# Patient Record
Sex: FEMALE | Race: WHITE | NOT HISPANIC OR LATINO | Employment: UNEMPLOYED | URBAN - METROPOLITAN AREA
[De-identification: names, ages, dates, MRNs, and addresses within clinical notes are randomized per-mention and may not be internally consistent; named-entity substitution may affect disease eponyms.]

---

## 2023-08-23 ENCOUNTER — HOSPITAL ENCOUNTER (OUTPATIENT)
Dept: DATA CONVERSION | Facility: HOSPITAL | Age: 33
Discharge: HOME | End: 2023-08-23
Payer: COMMERCIAL

## 2023-08-23 DIAGNOSIS — Z00.00 ENCOUNTER FOR GENERAL ADULT MEDICAL EXAMINATION WITHOUT ABNORMAL FINDINGS: ICD-10-CM

## 2023-08-23 DIAGNOSIS — R63.4 ABNORMAL WEIGHT LOSS: ICD-10-CM

## 2023-08-23 DIAGNOSIS — Z13.1 ENCOUNTER FOR SCREENING FOR DIABETES MELLITUS: ICD-10-CM

## 2023-08-23 DIAGNOSIS — R20.2 PARESTHESIA OF SKIN: ICD-10-CM

## 2023-08-23 LAB
25(OH)D3 SERPL-MCNC: 107 NG/ML (ref 31–100)
ALBUMIN SERPL-MCNC: 4.2 GM/DL (ref 3.5–5)
ALBUMIN/GLOB SERPL: 2.3 RATIO (ref 1.5–3)
ALP BLD-CCNC: 65 U/L (ref 35–125)
ALT SERPL-CCNC: 53 U/L (ref 5–40)
ANION GAP SERPL CALCULATED.3IONS-SCNC: 11 MMOL/L (ref 0–19)
APPEARANCE PLAS: CLEAR
AST SERPL-CCNC: 38 U/L (ref 5–40)
BILIRUB SERPL-MCNC: 0.3 MG/DL (ref 0.1–1.2)
BUN SERPL-MCNC: 35 MG/DL (ref 8–25)
BUN/CREAT SERPL: 35 RATIO (ref 8–21)
CALCIUM SERPL-MCNC: 8.9 MG/DL (ref 8.5–10.4)
CHLORIDE SERPL-SCNC: 108 MMOL/L (ref 97–107)
CHOLEST SERPL-MCNC: 173 MG/DL (ref 133–200)
CHOLEST/HDLC SERPL: 3.1 RATIO
CO2 SERPL-SCNC: 24 MMOL/L (ref 24–31)
COLOR SPUN FLD: YELLOW
CREAT SERPL-MCNC: 1 MG/DL (ref 0.4–1.6)
DEPRECATED RDW RBC AUTO: 46.5 FL (ref 37–54)
ERYTHROCYTE [DISTWIDTH] IN BLOOD BY AUTOMATED COUNT: 12.8 % (ref 11.7–15)
FASTING STATUS PATIENT QL REPORTED: NORMAL
FOLATE SERPL-MCNC: 13.2 NG/ML (ref 4.2–19.9)
GFR SERPL CREATININE-BSD FRML MDRD: 77 ML/MIN/1.73 M2
GLOBULIN SER-MCNC: 1.8 G/DL (ref 1.9–3.7)
GLUCOSE SERPL-MCNC: 89 MG/DL (ref 65–99)
HBA1C MFR BLD: 4.6 % (ref 4–6)
HCT VFR BLD AUTO: 37.6 % (ref 36–44)
HDLC SERPL-MCNC: 56 MG/DL
HGB BLD-MCNC: 12.6 GM/DL (ref 12–15)
IRON SATN MFR SERPL: 17.1 % (ref 12–50)
IRON SERPL-MCNC: 60 UG/DL (ref 30–160)
LDLC SERPL CALC-MCNC: 105 MG/DL (ref 65–130)
MCH RBC QN AUTO: 33 PG (ref 26–34)
MCHC RBC AUTO-ENTMCNC: 33.5 % (ref 31–37)
MCV RBC AUTO: 98.4 FL (ref 80–100)
NRBC BLD-RTO: 0 /100 WBC
PLATELET # BLD AUTO: 188 K/UL (ref 150–450)
PMV BLD AUTO: 10.9 CU (ref 7–12.6)
POTASSIUM SERPL-SCNC: 4.5 MMOL/L (ref 3.4–5.1)
PROT SERPL-MCNC: 6 G/DL (ref 5.9–7.9)
RBC # BLD AUTO: 3.82 M/UL (ref 4–4.9)
SODIUM SERPL-SCNC: 143 MMOL/L (ref 133–145)
TIBC SERPL-MCNC: 350 UG/DL (ref 228–428)
TRIGL SERPL-MCNC: 61 MG/DL (ref 40–150)
TSH SERPL DL<=0.05 MIU/L-ACNC: 3.18 MIU/L (ref 0.27–4.2)
VIT B12 SERPL-MCNC: 1178 PG/ML (ref 211–946)
WBC # BLD AUTO: 4 K/UL (ref 4.5–11)

## 2023-10-26 PROBLEM — R87.810 CERVICAL HIGH RISK HUMAN PAPILLOMAVIRUS (HPV) DNA TEST POSITIVE: Status: ACTIVE | Noted: 2023-10-26

## 2023-10-26 PROBLEM — K29.60 REFLUX GASTRITIS: Status: ACTIVE | Noted: 2023-10-26

## 2023-10-26 PROBLEM — D72.819 LEUKOPENIA: Status: ACTIVE | Noted: 2023-10-26

## 2023-10-26 PROBLEM — N81.10 FEMALE CYSTOCELE: Status: ACTIVE | Noted: 2023-10-26

## 2023-10-26 PROBLEM — N91.2 AMENORRHEA: Status: ACTIVE | Noted: 2023-10-26

## 2023-10-26 PROBLEM — N60.19 FIBROCYSTIC BREAST DISEASE (FCBD): Status: ACTIVE | Noted: 2023-10-26

## 2023-10-26 PROBLEM — R20.2 PARESTHESIAS: Status: ACTIVE | Noted: 2023-10-26

## 2023-10-26 RX ORDER — MIRTAZAPINE 15 MG/1
0.5 TABLET, FILM COATED ORAL NIGHTLY
COMMUNITY
Start: 2023-08-22 | End: 2023-10-27 | Stop reason: WASHOUT

## 2023-10-26 RX ORDER — IBUPROFEN 600 MG/1
1 TABLET ORAL EVERY 6 HOURS PRN
COMMUNITY
End: 2024-02-07 | Stop reason: WASHOUT

## 2023-10-26 RX ORDER — DOCUSATE SODIUM 100 MG/1
1 CAPSULE, LIQUID FILLED ORAL DAILY PRN
COMMUNITY
End: 2023-10-27 | Stop reason: WASHOUT

## 2023-10-26 ASSESSMENT — ENCOUNTER SYMPTOMS
MUSCLE WEAKNESS: 1
INABILITY TO BEAR WEIGHT: 1
TINGLING: 1
LOSS OF MOTION: 1

## 2023-10-27 ENCOUNTER — TELEPHONE (OUTPATIENT)
Dept: PRIMARY CARE | Facility: CLINIC | Age: 33
End: 2023-10-27

## 2023-10-27 ENCOUNTER — OFFICE VISIT (OUTPATIENT)
Dept: PRIMARY CARE | Facility: CLINIC | Age: 33
End: 2023-10-27
Payer: COMMERCIAL

## 2023-10-27 VITALS
WEIGHT: 89.2 LBS | HEART RATE: 43 BPM | TEMPERATURE: 96.7 F | SYSTOLIC BLOOD PRESSURE: 108 MMHG | BODY MASS INDEX: 14.34 KG/M2 | OXYGEN SATURATION: 100 % | DIASTOLIC BLOOD PRESSURE: 68 MMHG | HEIGHT: 66 IN

## 2023-10-27 DIAGNOSIS — F32.0 MILD MAJOR DEPRESSION (CMS-HCC): ICD-10-CM

## 2023-10-27 DIAGNOSIS — E46 PROTEIN-CALORIE MALNUTRITION, UNSPECIFIED SEVERITY (MULTI): Primary | ICD-10-CM

## 2023-10-27 DIAGNOSIS — S99.911A INJURY OF RIGHT ANKLE, INITIAL ENCOUNTER: ICD-10-CM

## 2023-10-27 PROBLEM — N87.1 DYSPLASIA OF CERVIX, HIGH GRADE CIN 2: Status: ACTIVE | Noted: 2018-02-05

## 2023-10-27 PROCEDURE — 3008F BODY MASS INDEX DOCD: CPT | Performed by: FAMILY MEDICINE

## 2023-10-27 PROCEDURE — 99214 OFFICE O/P EST MOD 30 MIN: CPT | Performed by: FAMILY MEDICINE

## 2023-10-27 PROCEDURE — 1036F TOBACCO NON-USER: CPT | Performed by: FAMILY MEDICINE

## 2023-10-27 ASSESSMENT — PATIENT HEALTH QUESTIONNAIRE - PHQ9
2. FEELING DOWN, DEPRESSED OR HOPELESS: NOT AT ALL
1. LITTLE INTEREST OR PLEASURE IN DOING THINGS: NOT AT ALL
SUM OF ALL RESPONSES TO PHQ9 QUESTIONS 1 AND 2: 0

## 2023-10-27 ASSESSMENT — PAIN SCALES - GENERAL: PAINLEVEL: 10-WORST PAIN EVER

## 2023-10-27 NOTE — PATIENT INSTRUCTIONS
Problem List Items Addressed This Visit    None  Visit Diagnoses         Codes    Protein-calorie malnutrition, unspecified severity (CMS/HCC)    -  Primary E46            Additional Visit Plans:  YOUR BMI IS NOW 14.6!!!! YOU ARE MALNOURISHED. YOU HAVE LOST OVER 7 LBS IN THE PAST MONTH. NOT IMPROVING YOUR WEIGHT OR EVEN MAINTAINING.     Please go and see the nutritionist. You need to find out what you can add to your diet to maintain your metabolism and energy needs, but also to improve your weight and health. This is becoming concerning and dangerous. A one time binge on occasion is not enough to offset things.     I recommend another trial of mirtazapine for at least 3 weeks. Recruit help with the kids. Take this at night before bed so that you sleep through the drowsiness.     Please go to [facility] and do the clinical intake. Then pair up with a therapist.     Follow up with me in 2 weeks.     I recommend applying voltaren gel to the area 3-4 times a day as needed and taking 500mg tylenol every 6 hours.      Will get xray to look at bony alignment / any fracture and have you see Dr. Mckinney        Patient Care Team:  Vincent Armenta DO as PCP - General (Family Medicine)  Vincent Armenta DO as Primary Care Provider    Vincent Armenta DO   10/27/23   11:19 AM

## 2023-10-27 NOTE — TELEPHONE ENCOUNTER
Referral placed to social work. I am going to try and recruit the help of faby Melgar to help with patient. Please let Luanne know and tell her that Faby may be in touch sometime.  I am extremely concerned about Luanne's condition and something needs to change / happen to improve her health. Please tell Luanne that I hope she will partner with me in getting things better. And that if she does not work with me on this then this may prove to be a nontherapeutic relationship and may result in dismissal from our practice as I am being ineffective as a healthcare provider for her condition.  Perhaps a different primary care provider would have better outcomes / treatment plans to help her and I do not want to get in the way of that.

## 2023-10-27 NOTE — PROGRESS NOTES
Outpatient Visit Note    Chief Complaint   Patient presents with    Ankle Pain     Right ankle swelling and bruising for a few weeks       HPI:  Luanne Mahan is a 33 y.o. female here for ankle concerns.    Last saw her just over a month ago.  She is due for follow-up with me at this time as a pertains to her weight and nutrition, and her mental health.    She has lost just over 7 pounds since I last saw her last month.    I previously recommended mirtazapine but she tried this very briefly and discontinued it as she felt very exhausted right after taking the medication.    Not seeing Beryl anymore.  Has not established with a new counselor, says one referral does not take her insurance. One said come in anytime for an intake which she has not done. She says she did reach out to a person that she read was a good therapist (at [facility]) Sudha Schumacher. Has left 3 message for call back, has not heard anything.     With regards to her ankle, she states that a few weeks ago she started to gradually feel some pain along her anterior ankle, radiating up her anterior leg. No known trauma. She feels things are clinking in her ankle / not sitting right. Went to a chiropractor and he tried to put it back in place and he told her something is not aligned right. He asked her to stop running which she has done. On occasion her ankle swells. It feels bruised along the high proximal end of her anterior ankle (right foot). Wearing a compression feels good and takes the pain down a lot. Getting worse over time. She is limping. Can barely put pressure on it. Is getting tingling and temperature change sensation in her foot.     Says there was a day when she was walking on the treadmill and felt a little pinch in that area. She kept walking and things were seemingly ok.     Had tried IBU twice with no relief.     With all of my questioning pertaining to her weight and her lifestyle, telling patient that she is now  malnourished and that she is definitely not eating enough regardless of how much she is exercising or how much she has cut back from her running.  Patient is telling me today repeatedly that the scales are wrong, that she weighed over 100 pounds yesterday at home.  I explained to her that I am comparing her weight on the same scale that we used at her last 2 visits.  She kept telling me that this is wrong.  She assures me that she is making efforts to eat more.  On occasion she will go out on the weekends and say that she binges a ton.  I explained to her that this is not enough to offset her poor nutrition and to maintain her weight, let alone help improve her weight to a healthy level.  We discussed that binge eating is not healthy either.  She states that she feels healthy, she does not feel malnourished.  I told her that she is malnourished.      No past medical history on file.     Current Medications  Current Outpatient Medications   Medication Instructions    ibuprofen 600 mg tablet 1 tablet, oral, Every 6 hours PRN    lactobacillus acidophilus & bulgar (Lactinex) 1 million cell chewable tablet 1 tablet, oral, Daily    MULTIVITAMIN ORAL 1 tablet, oral, Daily        Allergies  Allergies   Allergen Reactions    Cat Dander Shortness of breath and Runny nose    Loracarbef Hives and Itching        No past surgical history on file.  Family History   Problem Relation Name Age of Onset    Panic attack Mother      Diabetes Father      Congenital heart disease Father      No Known Problems Sister      No Known Problems Brother      No Known Problems Son      Diabetes Maternal Grandmother      Breast cancer Maternal Grandmother      Hypertension Maternal Grandfather      Heart disease Paternal Grandmother      Stroke Paternal Grandmother      Heart disease Paternal Grandfather          Tobacco Use: Not on file        ROS  All pertinent positive symptoms are included in the history of present illness.  All other systems  have been reviewed and are negative and noncontributory to this patient's current ailments.    VITAL SIGNS  Vitals:    10/27/23 1054   BP: 108/68   Pulse: (!) 43   Temp: 35.9 °C (96.7 °F)   SpO2: 100%     Vitals:    10/27/23 1054   Weight: (!) 40.5 kg (89 lb 3.2 oz)      Body mass index is 14.62 kg/m².     PHYSICAL EXAM  GENERAL APPEARANCE: very thin, looks like stated age, in no acute distress, not ill or tired appearing, conversing well.   HEENT: no trauma, normocephalic.   NECK: supple without rigidity, no neck mass was observed.   LUNGS: good chest wall expansion. In no acute respiratory distress.   EXTREMITIES: moving all extremities equally with no edema.  Tenderness to light palpation over the proximal anterior aspect of her right ankle without palpable bony deformity, mild induration.  No abrasions.  No increased laxity with inversion, eversion or talar tilt.  Pain with resisted foot dorsiflexion.  No pain or swelling over the lateral or medial malleoli  SKIN: normal skin color and pigmentation, without rash.   NEUROLOGIC EXAM: CN II-XII grossly intact, limping  PSYCH: mood and affect appropriate; alert and oriented to time, place, person; no difficulty with speech or language.       Assessment/Plan   Problem List Items Addressed This Visit    None  Visit Diagnoses         Codes    Protein-calorie malnutrition, unspecified severity (CMS/HCC)    -  Primary E46            Additional Visit Plans:  YOUR BMI IS NOW 14.6!!!! YOU ARE MALNOURISHED. YOU HAVE LOST OVER 7 LBS IN THE PAST MONTH. NOT IMPROVING YOUR WEIGHT OR EVEN MAINTAINING.     Please go and see the nutritionist. You need to find out what you can add to your diet to maintain your metabolism and energy needs, but also to improve your weight and health. This is becoming concerning and dangerous. A one time binge on occasion is not enough to offset things.     I recommend another trial of mirtazapine for at least 3 weeks. Recruit help with the kids. Take  this at night before bed so that you sleep through the drowsiness.     Please go to [facility] and do the clinical intake. Then pair up with a therapist.     Follow up with me in 2 weeks.     I recommend applying voltaren gel to the area 3-4 times a day as needed and taking 500mg tylenol every 6 hours.      Will get xray to look at bony alignment / any fracture and have you see Dr. Mckinney        Patient Care Team:  Vincent Armenta DO as PCP - General (Family Medicine)  Vincent Armenta DO as Primary Care Provider    Vincent Armenta DO   10/27/23   11:19 AM

## 2023-10-30 ENCOUNTER — HOSPITAL ENCOUNTER (OUTPATIENT)
Dept: RADIOLOGY | Facility: HOSPITAL | Age: 33
Discharge: HOME | End: 2023-10-30
Payer: COMMERCIAL

## 2023-10-30 ENCOUNTER — E-CONSULT (OUTPATIENT)
Dept: BEHAVIORAL HEALTH | Facility: CLINIC | Age: 33
End: 2023-10-30
Payer: COMMERCIAL

## 2023-10-30 ENCOUNTER — PATIENT OUTREACH (OUTPATIENT)
Dept: CARE COORDINATION | Facility: CLINIC | Age: 33
End: 2023-10-30
Payer: COMMERCIAL

## 2023-10-30 ENCOUNTER — TELEPHONE (OUTPATIENT)
Dept: PRIMARY CARE | Facility: CLINIC | Age: 33
End: 2023-10-30

## 2023-10-30 DIAGNOSIS — S99.911A INJURY OF RIGHT ANKLE, INITIAL ENCOUNTER: ICD-10-CM

## 2023-10-30 PROCEDURE — 73590 X-RAY EXAM OF LOWER LEG: CPT | Mod: RT,FY

## 2023-10-30 PROCEDURE — 73610 X-RAY EXAM OF ANKLE: CPT | Mod: RT,FY

## 2023-10-30 SDOH — ECONOMIC STABILITY: FOOD INSECURITY
ARE ANY OF YOUR NEEDS URGENT? FOR EXAMPLE, UNCERTAINTY OF WHERE YOU WILL GET YOUR NEXT MEAL OR NOT HAVING THE MEDICATIONS YOU NEED TO TAKE TOMORROW.: YES

## 2023-10-30 SDOH — ECONOMIC STABILITY: GENERAL: WOULD YOU LIKE HELP WITH ANY OF THE FOLLOWING NEEDS?: SAFETY CONCERN;FOOD INSECURITY;OTHER

## 2023-10-30 NOTE — PROGRESS NOTES
"Received referral from Dr. ISAIAS Armenta for follow up and assistance with addressing Luanne's depression, anxiety and malnutrition concerns.  Pt has a history of depression, anxiety, weight loss.  She last saw her PCP on 10/27/2023 and lost another 7 lbs from 9/18/2023 visit.  PCP notes patient came to see her due to right ankle swelling and bruising for a few weeks. Dr. Schilling addressed her concerns pertaining to Luanne's continued weight loss and advised her that she is now malnourished and that she is not eating enough.  She was provided with resources for walk in at [facility].  Luanne had been seeing a counselor in the past and [facility]  but is not and is looking for a new counselor.  Outreach to Luanne this date to assess further, assist with Community resources and to address concerns that her PCP has shared with her.  Call to patient on her listed cell #.  She is not please with her PCP for referring to .  Explained our role and provided emotional support.  Resources reviewed to Luanne for [facility] walk in assess in [place] [phone].   She is aware of the concerns for both her mental and physical health concerns.  She does drive. Has health insurance with [place] [employer] and is able to obtain counseling and support.  She has not yet looked into her ankle follow up with referral provided but, does plan to call and make an appointment with Dr.Timothy Mckinney [facility]  Foot and Ankle specialist [phone]. Luanne does have our  Cell #.  Resources txt to her since she was in her car.  We will plan for follow up next week.  She is to see her PCP in 2 weeks for follow up.  No appointment noted at this time.  "

## 2023-10-30 NOTE — PROGRESS NOTES
In order to be admitted involuntarily for an eating disorder there would need to be evidence of imminently life-threatening nutritional deficiencies. Labs, physical exam findings, or history to support imminent threat would all be beneficial when filling out the pink slip. Then the physician or staff would call 911 and explain that the physician intends to  pink slip a patient.     If she does not have sufficient justification to be involuntarily admitted, she would benefit from frequent monitoring and education. An SSRI like fluoxetine may be beneficial with respect to eating disorders. I see that she had a diagnosis of MDD in 2018 and was taking venlafaxine with good effect. She may be willing to resume that. Perhaps it lost efficacy a while ago but after a washout period it may be beneficial again. Wellbutrin is contraindicated with eating disorders.    Lastly, you can provide her and her  with information about the [facility] (https://[employer]). They are a program with robust services for eating disorders in [place]. At the very least her  may be able to call and get advice for how to help his wife.     Thank you for the consult.

## 2023-11-06 ENCOUNTER — PATIENT OUTREACH (OUTPATIENT)
Dept: CARE COORDINATION | Facility: CLINIC | Age: 33
End: 2023-11-06
Payer: COMMERCIAL

## 2023-11-06 NOTE — PROGRESS NOTES
Chart reviewed and E-Consult noted with Elvin Burton Psychiatrist.  Outreach to Luanne this date on her listed contact #.  She shares that she does have an appointment with a podiatrist next week.  She also shares that she has left several messages with [facility] and has not heard back from them.  She has been active with this agency in the past and is trying to obtain a new counselor.  Discussion about concerns for her malnutrition and need for immediate support.  Education provided regarding the [facility] [phone]   Located at [address] [place] [address].  The [facility] is a program that addresses the intricate challenges that come with eating disorders.  They have many different programs that Luanne may benefit from after a complete assessment.  We encouraged Luanne to phone [facility] this week.  Emotional support provided and needed at this difficult time.  We provided Luanne with our new  Jabber # [phone].  We will plan to follow up with her in 1 week to check status and progress. We encouraged Luanne to phone this  with any further questions.

## 2023-11-08 NOTE — TELEPHONE ENCOUNTER
okay happy to work with her.  I see that she has connected with the .  Information has been provided about the [facility].  She is trying to connect with a new counselor.  I look forward to hearing any progress she has made when she comes in for her appointment.  In the meantime continue to focus on her nutritional intake with a well-rounded diet

## 2023-11-13 ENCOUNTER — PATIENT OUTREACH (OUTPATIENT)
Dept: CARE COORDINATION | Facility: CLINIC | Age: 33
End: 2023-11-13
Payer: COMMERCIAL

## 2023-11-13 NOTE — PROGRESS NOTES
Weekly follow up with Luanne this date.  Noted that she has an appointment with Dr. ISAIAS Armenta on 11/17/2023.  She also shares that she is seeing a new counselor Lizett Moore LPC with [facility] in [place].  Emotional support provided.  We provided patient with our new [place]er # [phone].  We will plan to follow up next week with Luanne.

## 2023-11-17 ENCOUNTER — APPOINTMENT (OUTPATIENT)
Dept: PRIMARY CARE | Facility: CLINIC | Age: 33
End: 2023-11-17
Payer: COMMERCIAL

## 2023-11-20 ENCOUNTER — PATIENT OUTREACH (OUTPATIENT)
Dept: CARE COORDINATION | Facility: CLINIC | Age: 33
End: 2023-11-20
Payer: COMMERCIAL

## 2023-11-20 NOTE — PROGRESS NOTES
"Received return phone call from Luanne. She is now working with Lizett Moore LPC with [facility] [phone] and visit was cancelled by the provided due to health issue.  Patient rescheduling and will continue to work with Lizett regarding her depression weight loss issues and will work on \"making myself a priority\". More family support and spousal support in place per Luanne.  Recent cancellation of her appointment with PCP noted and discussed with Luanne.  Mal-nutritional concerns discussed as well as options if needed for the [facility] here in [place].  Luanne feels she is starting to address her mental health concerns and cancelled with PCP due to increased Panic attacks and anxiety leading up to her appointment with PCP. Patient shares her  is very supportive and she will continue to work with LPC to address all issues.  Luanne does have our Jabber # [phone].  Discussed all resources and need for ongoing Counseling.  We will follow up with Luanne next month.  Updated Goals and Care Plan.      "

## 2023-12-04 ENCOUNTER — PATIENT OUTREACH (OUTPATIENT)
Dept: CARE COORDINATION | Facility: CLINIC | Age: 33
End: 2023-12-04
Payer: COMMERCIAL

## 2023-12-04 NOTE — CARE PLAN
Follow up outreach to Luanne.  She is on her way to meet with her counselor Lizett Moore LPC with [facility] and plans to continue working with Lizett to address her issues.  She does not feel our services are needed any further.  She does have our Jabber # [phone].  We will close Luanne at this time.    CASE CLOSED  Problem: Access to Care Issue  Goal: Assess and Address Access Barriers  Outcome: Progressing     Problem: Coordination of Community Resources Needed  Goal: Coordination of Services will be Obtained  Outcome: Progressing     Problem: Coordination of Psychosocial Support Services Needed  Goal: Coordination of Services will be Obtained  Outcome: Progressing

## 2023-12-22 ENCOUNTER — TELEPHONE (OUTPATIENT)
Dept: PRIMARY CARE | Facility: CLINIC | Age: 33
End: 2023-12-22

## 2023-12-22 ENCOUNTER — APPOINTMENT (OUTPATIENT)
Dept: PRIMARY CARE | Facility: CLINIC | Age: 33
End: 2023-12-22
Payer: COMMERCIAL

## 2023-12-22 ENCOUNTER — DOCUMENTATION (OUTPATIENT)
Dept: PRIMARY CARE | Facility: CLINIC | Age: 33
End: 2023-12-22
Payer: COMMERCIAL

## 2024-01-03 ENCOUNTER — APPOINTMENT (OUTPATIENT)
Dept: PRIMARY CARE | Facility: CLINIC | Age: 34
End: 2024-01-03
Payer: COMMERCIAL

## 2024-01-22 ASSESSMENT — PROMIS GLOBAL HEALTH SCALE
RATE_QUALITY_OF_LIFE: GOOD
RATE_QUALITY_OF_LIFE: GOOD
RATE_SOCIAL_SATISFACTION: GOOD
RATE_AVERAGE_PAIN: 0
RATE_AVERAGE_FATIGUE: MILD
CARRYOUT_PHYSICAL_ACTIVITIES: COMPLETELY
CARRYOUT_PHYSICAL_ACTIVITIES: COMPLETELY
RATE_AVERAGE_FATIGUE: MILD
RATE_SOCIAL_SATISFACTION: GOOD
RATE_PHYSICAL_HEALTH: GOOD
RATE_AVERAGE_PAIN: 0
RATE_MENTAL_HEALTH: GOOD
EMOTIONAL_PROBLEMS: SOMETIMES
EMOTIONAL_PROBLEMS: SOMETIMES
RATE_PHYSICAL_HEALTH: GOOD
CARRYOUT_SOCIAL_ACTIVITIES: GOOD
CARRYOUT_SOCIAL_ACTIVITIES: GOOD
RATE_MENTAL_HEALTH: GOOD
RATE_GENERAL_HEALTH: GOOD
RATE_GENERAL_HEALTH: GOOD

## 2024-01-24 ENCOUNTER — APPOINTMENT (OUTPATIENT)
Dept: PRIMARY CARE | Facility: CLINIC | Age: 34
End: 2024-01-24
Payer: COMMERCIAL

## 2024-02-07 ENCOUNTER — OFFICE VISIT (OUTPATIENT)
Dept: PRIMARY CARE | Facility: CLINIC | Age: 34
End: 2024-02-07
Payer: COMMERCIAL

## 2024-02-07 VITALS
SYSTOLIC BLOOD PRESSURE: 110 MMHG | HEIGHT: 66 IN | BODY MASS INDEX: 15.43 KG/M2 | TEMPERATURE: 98.1 F | DIASTOLIC BLOOD PRESSURE: 70 MMHG | HEART RATE: 64 BPM | OXYGEN SATURATION: 99 % | WEIGHT: 96 LBS

## 2024-02-07 DIAGNOSIS — F41.1 GENERALIZED ANXIETY DISORDER: ICD-10-CM

## 2024-02-07 DIAGNOSIS — E46 PROTEIN-CALORIE MALNUTRITION, UNSPECIFIED SEVERITY (MULTI): ICD-10-CM

## 2024-02-07 DIAGNOSIS — R63.0 ANOREXIA: Primary | ICD-10-CM

## 2024-02-07 DIAGNOSIS — R63.2 BINGE EATING: ICD-10-CM

## 2024-02-07 PROCEDURE — 3008F BODY MASS INDEX DOCD: CPT | Performed by: FAMILY MEDICINE

## 2024-02-07 PROCEDURE — 1036F TOBACCO NON-USER: CPT | Performed by: FAMILY MEDICINE

## 2024-02-07 PROCEDURE — 99214 OFFICE O/P EST MOD 30 MIN: CPT | Performed by: FAMILY MEDICINE

## 2024-02-07 RX ORDER — COLLAGEN, HYDROLYSATE (BOVINE) 100 %
100 POWDER (GRAM) MISCELLANEOUS DAILY
COMMUNITY

## 2024-02-07 RX ORDER — FLUOXETINE HYDROCHLORIDE 20 MG/1
20 CAPSULE ORAL DAILY
Qty: 30 CAPSULE | Refills: 1 | Status: SHIPPED | OUTPATIENT
Start: 2024-02-07 | End: 2024-04-11 | Stop reason: WASHOUT

## 2024-02-07 ASSESSMENT — ANXIETY QUESTIONNAIRES
7. FEELING AFRAID AS IF SOMETHING AWFUL MIGHT HAPPEN: NEARLY EVERY DAY
3. WORRYING TOO MUCH ABOUT DIFFERENT THINGS: NEARLY EVERY DAY
4. TROUBLE RELAXING: NEARLY EVERY DAY
IF YOU CHECKED OFF ANY PROBLEMS ON THIS QUESTIONNAIRE, HOW DIFFICULT HAVE THESE PROBLEMS MADE IT FOR YOU TO DO YOUR WORK, TAKE CARE OF THINGS AT HOME, OR GET ALONG WITH OTHER PEOPLE: VERY DIFFICULT
GAD7 TOTAL SCORE: 19
1. FEELING NERVOUS, ANXIOUS, OR ON EDGE: MORE THAN HALF THE DAYS
6. BECOMING EASILY ANNOYED OR IRRITABLE: NEARLY EVERY DAY
2. NOT BEING ABLE TO STOP OR CONTROL WORRYING: MORE THAN HALF THE DAYS
5. BEING SO RESTLESS THAT IT IS HARD TO SIT STILL: NEARLY EVERY DAY

## 2024-02-07 ASSESSMENT — PATIENT HEALTH QUESTIONNAIRE - PHQ9
3. TROUBLE FALLING OR STAYING ASLEEP OR SLEEPING TOO MUCH: NEARLY EVERY DAY
1. LITTLE INTEREST OR PLEASURE IN DOING THINGS: SEVERAL DAYS
4. FEELING TIRED OR HAVING LITTLE ENERGY: SEVERAL DAYS
2. FEELING DOWN, DEPRESSED OR HOPELESS: SEVERAL DAYS
6. FEELING BAD ABOUT YOURSELF - OR THAT YOU ARE A FAILURE OR HAVE LET YOURSELF OR YOUR FAMILY DOWN: SEVERAL DAYS
7. TROUBLE CONCENTRATING ON THINGS, SUCH AS READING THE NEWSPAPER OR WATCHING TELEVISION: SEVERAL DAYS
8. MOVING OR SPEAKING SO SLOWLY THAT OTHER PEOPLE COULD HAVE NOTICED. OR THE OPPOSITE, BEING SO FIGETY OR RESTLESS THAT YOU HAVE BEEN MOVING AROUND A LOT MORE THAN USUAL: SEVERAL DAYS
5. POOR APPETITE OR OVEREATING: NEARLY EVERY DAY
SUM OF ALL RESPONSES TO PHQ QUESTIONS 1-9: 13
SUM OF ALL RESPONSES TO PHQ9 QUESTIONS 1 AND 2: 2
10. IF YOU CHECKED OFF ANY PROBLEMS, HOW DIFFICULT HAVE THESE PROBLEMS MADE IT FOR YOU TO DO YOUR WORK, TAKE CARE OF THINGS AT HOME, OR GET ALONG WITH OTHER PEOPLE: VERY DIFFICULT
9. THOUGHTS THAT YOU WOULD BE BETTER OFF DEAD, OR OF HURTING YOURSELF: SEVERAL DAYS

## 2024-02-07 ASSESSMENT — PAIN SCALES - GENERAL: PAINLEVEL: 0-NO PAIN

## 2024-02-07 ASSESSMENT — LIFESTYLE VARIABLES: HOW MANY STANDARD DRINKS CONTAINING ALCOHOL DO YOU HAVE ON A TYPICAL DAY: PATIENT DOES NOT DRINK

## 2024-02-07 NOTE — PATIENT INSTRUCTIONS
Problem List Items Addressed This Visit    None  Visit Diagnoses         Codes    Anorexia    -  Primary R63.0    Relevant Medications    FLUoxetine (PROzac) 20 mg capsule    Protein-calorie malnutrition, unspecified severity (CMS/HCC)     E46    Binge eating     R63.2    Relevant Medications    FLUoxetine (PROzac) 20 mg capsule    Generalized anxiety disorder     F41.1    Relevant Medications    FLUoxetine (PROzac) 20 mg capsule            Additional Visit Plans:  We discussed your poor relationship with food and using restrictions or binging as a coping mechanism. We will try Fluoxetine to help with your anxiety / OCD which can also be effective at reducing binging up to 60%.    I want you to find a high protein diet and work with your resources and therapist on healthy weight gain. Consider a  that specializes in body building per your interests.     Follow up in 4 weeks.     Edmund caban - Happy worms. Vit D and Saffron.       Patient Care Team:  Vincent Armenta DO as PCP - General (Family Medicine)  Vincent Armenta DO as Primary Care Provider    Vincent Armenta DO   02/07/24   11:40 AM

## 2024-02-07 NOTE — PROGRESS NOTES
"         Outpatient Visit Note    Chief Complaint   Patient presents with    Follow-up    Weight Check     FOLLOWUP ON WEIGHT CHECK; CONCERNS WITH ANXIETY        HPI:  Luanne Mahan is a 33 y.o. female here for ER follow-up and to touch base regarding her chronic conditions.    Her weight is up 7 pounds since October.  Her BMI is 15.73.     Since our last visit in October she says that she left [facility] her therapist. Had relationship struggles with her  and his parents. She got very depressed from all this. Dec 22nd she reached a breaking point. She was close to 80lbs. Something \"in my brain allowed me to start enjoying food.\" With this she is worse with her binging.    Started with a new therapist. Diagnosed with severe OCD and anxiety. Luanne agrees with this diagnosis. Binging helped with her guilt. Has a literal fear of going out and eating other foods not prepared by herself. But would go out on dates nights and then binge.     She is acknowledging having an eating disorder today.     With the holidays and gathering she binge at every other day for a while which helped her make back the additional weight she lost. It is hard for her to see she is gaining \"fat back.\" Wants to improve things from a fitness standpoint.     PHQ9/GAD7:  Over the past 2 weeks, how often have you been bothered by any of the following problems?  Trouble falling or staying asleep, or sleeping too much: Nearly every day  Feeling tired or having little energy: Several days  Poor appetite or overeating: Nearly every day  Feeling bad about yourself - or that you are a failure or have let yourself or your family down: Several days  Trouble concentrating on things, such as reading the newspaper or watching television: Several days  Moving or speaking so slowly that other people could have noticed? Or the opposite - being so fidgety or restless that you have been moving around a lot more than usual.: Several days  Thoughts that you " would be better off dead or hurting yourself in some way: Several days  Patient Health Questionnaire-9 Score: 13  Over the last 2 weeks, how often have you been bothered by any of the following problems?  Feeling nervous, anxious, or on edge: More than half the days  Not being able to stop or control worrying: More than half the days  Worrying too much about different things: Nearly every day  Trouble relaxing: Nearly every day  Being so restless that it is hard to sit still: Nearly every day  Becoming easily annoyed or irritable: Nearly every day  Feeling afraid as if something awful might happen: Nearly every day  ANTHONY-7 Total Score: 19      Past Medical History:   Diagnosis Date    Allergic     Anxiety     Depression     Eczema     Headache         Current Medications  Current Outpatient Medications   Medication Instructions    APPLE CIDER VINEGAR ORAL 100 mg, oral, Daily    collagen, hydr (bovine) (bulk) 100 %, miscellaneous, Daily    FLUoxetine (PROZAC) 20 mg, oral, Daily    lactobacillus acidophilus & bulgar (Lactinex) 1 million cell chewable tablet 1 tablet, oral, Daily    MULTIVITAMIN ORAL 1 tablet, oral, Daily        Allergies  Allergies   Allergen Reactions    Cat Dander Shortness of breath and Runny nose    Loracarbef Hives and Itching        Past Surgical History:   Procedure Laterality Date    WISDOM TOOTH EXTRACTION       Family History   Problem Relation Name Age of Onset    Panic attack Mother Teresa stovall tiakahlilkeeley     Arthritis Mother Teresa barbakeeley     Depression Mother Teresa webersonal     Genetic Testing Mother Teresa barbakeeley     Hypertension Mother Teresa barbakeeley     Mental illness Mother Teresa barbakeeley     Miscarriages / Stillbirths Mother Teresa barbakeeley     Diabetes Father Delonte Bogdan     Congenital heart disease Father Delonte Bogdan     Hernia Father Delonte Bogdan     No Known Problems Sister      No Known Problems Brother      No Known Problems Son      Diabetes Maternal  Grandmother Bare Dario     Breast cancer Maternal Grandmother Bare Valentic     Cancer Maternal Grandmother Chance Bishop     Hypertension Maternal Grandfather      Heart disease Paternal Grandmother Berta     Stroke Paternal Grandmother Berta     Heart disease Paternal Grandfather Marty bunjekeeley     Blood clot Paternal Grandfather Marty bunjekeeley     Hearing loss Paternal Grandfather Marty enrique      Social History     Tobacco Use    Smoking status: Never    Smokeless tobacco: Never   Vaping Use    Vaping Use: Never used   Substance Use Topics    Alcohol use: Never    Drug use: Never     Tobacco Use: Low Risk  (2/7/2024)    Patient History     Smoking Tobacco Use: Never     Smokeless Tobacco Use: Never     Passive Exposure: Not on file        ROS  All pertinent positive symptoms are included in the history of present illness.  All other systems have been reviewed and are negative and noncontributory to this patient's current ailments.    VITAL SIGNS  Vitals:    02/07/24 1053   BP: 110/70   Pulse: 64   Temp: 36.7 °C (98.1 °F)   SpO2: 99%     Vitals:    02/07/24 1053   Weight: (!) 43.5 kg (96 lb)      Body mass index is 15.73 kg/m².     PHYSICAL EXAM  GENERAL APPEARANCE: thin, well developed, looks like stated age, in no acute distress, not ill or tired appearing, conversing well.   HEENT: no trauma, normocephalic.   NECK: supple without rigidity, no neck mass was observed.   LUNGS: good chest wall expansion. In no acute respiratory distress.   EXTREMITIES: moving all extremities equally with no edema.   SKIN: normal skin color and pigmentation, without rash.   NEUROLOGIC EXAM: CN II-XII grossly intact, normal gait.   PSYCH: anxious mood, affect appropriate; alert and oriented to time, place, person; no difficulty with speech or language.     Counseling:       Medication education:           Education:  The patient is counseled regarding potential side-effects of all new medications          Understanding:   Patient expressed understanding of information conveyed today          Adherence:  No barriers to adherence identified       Assessment/Plan   Problem List Items Addressed This Visit    None  Visit Diagnoses         Codes    Anorexia    -  Primary R63.0    Relevant Medications    FLUoxetine (PROzac) 20 mg capsule    Protein-calorie malnutrition, unspecified severity (CMS/HCC)     E46    Binge eating     R63.2    Relevant Medications    FLUoxetine (PROzac) 20 mg capsule    Generalized anxiety disorder     F41.1    Relevant Medications    FLUoxetine (PROzac) 20 mg capsule            Additional Visit Plans:  We discussed your poor relationship with food and using restrictions or binging as a coping mechanism. We will try Fluoxetine to help with your anxiety / OCD which can also be effective at reducing binging up to 60%.    I want you to find a high protein diet and work with your resources and therapist on healthy weight gain. Consider a  that specializes in body building per your interests.     Follow up in 4 weeks.     Edmund gummies - Happy worms. Vit D and Saffron.       Patient Care Team:  Vincent Armenta DO as PCP - General (Family Medicine)  Vincent Armenta DO as Primary Care Provider    Vincent Armenta DO   02/07/24   11:45 AM

## 2024-03-08 ENCOUNTER — APPOINTMENT (OUTPATIENT)
Dept: PRIMARY CARE | Facility: CLINIC | Age: 34
End: 2024-03-08
Payer: COMMERCIAL

## 2024-03-20 ENCOUNTER — OFFICE VISIT (OUTPATIENT)
Dept: PRIMARY CARE | Facility: CLINIC | Age: 34
End: 2024-03-20
Payer: COMMERCIAL

## 2024-03-20 VITALS
WEIGHT: 91 LBS | HEIGHT: 66 IN | SYSTOLIC BLOOD PRESSURE: 90 MMHG | BODY MASS INDEX: 14.63 KG/M2 | HEART RATE: 62 BPM | TEMPERATURE: 96.9 F | OXYGEN SATURATION: 98 % | DIASTOLIC BLOOD PRESSURE: 64 MMHG

## 2024-03-20 DIAGNOSIS — E46 PROTEIN-CALORIE MALNUTRITION, UNSPECIFIED SEVERITY (MULTI): Primary | ICD-10-CM

## 2024-03-20 DIAGNOSIS — F32.0 MILD MAJOR DEPRESSION (CMS-HCC): ICD-10-CM

## 2024-03-20 DIAGNOSIS — R63.2 BINGE EATING: ICD-10-CM

## 2024-03-20 DIAGNOSIS — F41.1 GENERALIZED ANXIETY DISORDER: ICD-10-CM

## 2024-03-20 DIAGNOSIS — F51.04 PSYCHOPHYSIOLOGICAL INSOMNIA: ICD-10-CM

## 2024-03-20 PROCEDURE — 99214 OFFICE O/P EST MOD 30 MIN: CPT | Performed by: FAMILY MEDICINE

## 2024-03-20 PROCEDURE — 1036F TOBACCO NON-USER: CPT | Performed by: FAMILY MEDICINE

## 2024-03-20 PROCEDURE — 3008F BODY MASS INDEX DOCD: CPT | Performed by: FAMILY MEDICINE

## 2024-03-20 RX ORDER — HYDROXYZINE HYDROCHLORIDE 10 MG/1
10 TABLET, FILM COATED ORAL NIGHTLY PRN
Qty: 30 TABLET | Refills: 0 | Status: SHIPPED | OUTPATIENT
Start: 2024-03-20 | End: 2024-05-10 | Stop reason: ALTCHOICE

## 2024-03-20 ASSESSMENT — ANXIETY QUESTIONNAIRES
3. WORRYING TOO MUCH ABOUT DIFFERENT THINGS: SEVERAL DAYS
GAD7 TOTAL SCORE: 7
2. NOT BEING ABLE TO STOP OR CONTROL WORRYING: SEVERAL DAYS
7. FEELING AFRAID AS IF SOMETHING AWFUL MIGHT HAPPEN: SEVERAL DAYS
5. BEING SO RESTLESS THAT IT IS HARD TO SIT STILL: SEVERAL DAYS
1. FEELING NERVOUS, ANXIOUS, OR ON EDGE: SEVERAL DAYS
6. BECOMING EASILY ANNOYED OR IRRITABLE: SEVERAL DAYS
IF YOU CHECKED OFF ANY PROBLEMS ON THIS QUESTIONNAIRE, HOW DIFFICULT HAVE THESE PROBLEMS MADE IT FOR YOU TO DO YOUR WORK, TAKE CARE OF THINGS AT HOME, OR GET ALONG WITH OTHER PEOPLE: SOMEWHAT DIFFICULT
4. TROUBLE RELAXING: SEVERAL DAYS

## 2024-03-20 ASSESSMENT — PATIENT HEALTH QUESTIONNAIRE - PHQ9
6. FEELING BAD ABOUT YOURSELF - OR THAT YOU ARE A FAILURE OR HAVE LET YOURSELF OR YOUR FAMILY DOWN: SEVERAL DAYS
7. TROUBLE CONCENTRATING ON THINGS, SUCH AS READING THE NEWSPAPER OR WATCHING TELEVISION: NOT AT ALL
9. THOUGHTS THAT YOU WOULD BE BETTER OFF DEAD, OR OF HURTING YOURSELF: NOT AT ALL
10. IF YOU CHECKED OFF ANY PROBLEMS, HOW DIFFICULT HAVE THESE PROBLEMS MADE IT FOR YOU TO DO YOUR WORK, TAKE CARE OF THINGS AT HOME, OR GET ALONG WITH OTHER PEOPLE: SOMEWHAT DIFFICULT
3. TROUBLE FALLING OR STAYING ASLEEP OR SLEEPING TOO MUCH: NEARLY EVERY DAY
SUM OF ALL RESPONSES TO PHQ QUESTIONS 1-9: 6
SUM OF ALL RESPONSES TO PHQ9 QUESTIONS 1 AND 2: 0
8. MOVING OR SPEAKING SO SLOWLY THAT OTHER PEOPLE COULD HAVE NOTICED. OR THE OPPOSITE, BEING SO FIGETY OR RESTLESS THAT YOU HAVE BEEN MOVING AROUND A LOT MORE THAN USUAL: NOT AT ALL
2. FEELING DOWN, DEPRESSED OR HOPELESS: NOT AT ALL
4. FEELING TIRED OR HAVING LITTLE ENERGY: SEVERAL DAYS
5. POOR APPETITE OR OVEREATING: SEVERAL DAYS
1. LITTLE INTEREST OR PLEASURE IN DOING THINGS: NOT AT ALL

## 2024-03-20 ASSESSMENT — LIFESTYLE VARIABLES: HOW MANY STANDARD DRINKS CONTAINING ALCOHOL DO YOU HAVE ON A TYPICAL DAY: PATIENT DOES NOT DRINK

## 2024-03-20 ASSESSMENT — PAIN SCALES - GENERAL: PAINLEVEL: 0-NO PAIN

## 2024-03-20 NOTE — PROGRESS NOTES
"         Outpatient Visit Note    Chief Complaint   Patient presents with    Follow-up     4week followup       HPI:  Luanne Mahan is a 33 y.o. female here for follow-up on her eating disorder and mood.    She has severe OCD and anxiety which results in binging of her food but she also has a fear relationship with food as well.  She sees a counselor for her eating disorder.      Last month we discussed improving things from a fitness standpoint so she can feel comfortable with any weights that she does gain back to be in a normal BMI.  She was agreeable to try fluoxetine to help with anxiety and OCD which is also effective at reducing binging up to 60%.  I recommended a high-protein diet and to work with resources such as a  that specializes in body building per her interests.  I also recommended Edmund Gummies with vitamin D and saffron.    Today she states that  the Fluoxetine has \"saved my life.\" Noticed within a week her eating was better with LESS BINGING. Less feeling of a BRAIN FOG - SAYS SHE HAS ENERGY AND CAN SEE CLEARLY. SHE IS NOT IRRITATED ANYMORE. Everyone says she is glowing and doing better.     Only side effect is insomnia. Falls asleep for 30 minutes then wakes up. Says she is so used to it since motherhood.     Weight today is 5 lbs less than last month. Says she has a binging episode the night before she was last year and thinks her weight was up then.     PHQ9/GAD7:  Over the past 2 weeks, how often have you been bothered by any of the following problems?  Trouble falling or staying asleep, or sleeping too much: Nearly every day  Feeling tired or having little energy: Several days  Poor appetite or overeating: Several days  Feeling bad about yourself - or that you are a failure or have let yourself or your family down: Several days  Trouble concentrating on things, such as reading the newspaper or watching television: Not at all  Moving or speaking so slowly that other people " could have noticed? Or the opposite - being so fidgety or restless that you have been moving around a lot more than usual.: Not at all  Thoughts that you would be better off dead or hurting yourself in some way: Not at all  Patient Health Questionnaire-9 Score: 6  Over the last 2 weeks, how often have you been bothered by any of the following problems?  Feeling nervous, anxious, or on edge: Several days  Not being able to stop or control worrying: Several days  Worrying too much about different things: Several days  Trouble relaxing: Several days  Being so restless that it is hard to sit still: Several days  Becoming easily annoyed or irritable: Several days  Feeling afraid as if something awful might happen: Several days  ANTHONY-7 Total Score: 7      Past Medical History:   Diagnosis Date    Allergic     Anxiety     Depression     Eczema     Headache         Current Medications  Current Outpatient Medications   Medication Instructions    APPLE CIDER VINEGAR ORAL 100 mg, oral, Daily    collagen, hydr (bovine) (bulk) 100 %, miscellaneous, Daily    FLUoxetine (PROZAC) 20 mg, oral, Daily    hydrOXYzine HCL (ATARAX) 10 mg, oral, Nightly PRN    lactobacillus acidophilus & bulgar (Lactinex) 1 million cell chewable tablet 1 tablet, oral, Daily    MULTIVITAMIN ORAL 1 tablet, oral, Daily        Allergies  Allergies   Allergen Reactions    Cat Dander Shortness of breath and Runny nose    Loracarbef Hives and Itching        Past Surgical History:   Procedure Laterality Date    WISDOM TOOTH EXTRACTION       Family History   Problem Relation Name Age of Onset    Panic attack Mother Teresa enrique     Arthritis Mother Teresa enrique     Depression Mother Teresa enrique     Genetic Testing Mother Teresa enrique     Hypertension Mother Teresa enrique     Mental illness Mother Teresa enrique     Miscarriages / Stillbirths Mother Teresa enrique     Diabetes Father Delonte Bogdan     Congenital heart disease Father  Delonte Mcfadden     Hernia Father Delonte Mcfadden     No Known Problems Sister      No Known Problems Brother      No Known Problems Son      Diabetes Maternal Grandmother Bare Valentic     Breast cancer Maternal Grandmother Bare Valentic     Cancer Maternal Grandmother Bare Valentic     Hypertension Maternal Grandfather      Heart disease Paternal Grandmother Berta     Stroke Paternal Grandmother Berta     Heart disease Paternal Grandfather Marty mcfadden     Blood clot Paternal Grandfather Marty mcfadden     Hearing loss Paternal Grandfather Marty mcfadden      Social History     Tobacco Use    Smoking status: Never    Smokeless tobacco: Never   Vaping Use    Vaping Use: Never used   Substance Use Topics    Alcohol use: Not Currently    Drug use: Never     Tobacco Use: Low Risk  (3/20/2024)    Patient History     Smoking Tobacco Use: Never     Smokeless Tobacco Use: Never     Passive Exposure: Not on file        ROS  All pertinent positive symptoms are included in the history of present illness.  All other systems have been reviewed and are negative and noncontributory to this patient's current ailments.    VITAL SIGNS  Vitals:    03/20/24 1307   BP: 90/64   Pulse: 62   Temp: 36.1 °C (96.9 °F)   SpO2: 98%     Vitals:    03/20/24 1307   Weight: (!) 41.3 kg (91 lb)      Body mass index is 14.91 kg/m².     PHYSICAL EXAM  GENERAL APPEARANCE: thin, well developed, looks like stated age, in no acute distress, not ill or tired appearing, conversing well.   HEENT: no trauma, normocephalic.   NECK: supple without rigidity, no neck mass was observed.   LUNGS: good chest wall expansion. In no acute respiratory distress.   EXTREMITIES: moving all extremities equally with no edema.   SKIN: normal skin color and pigmentation, without rash.   NEUROLOGIC EXAM: CN II-XII grossly intact, normal gait.   PSYCH: improved mood and affect appropriate; cheerful, alert and oriented to time, place, person; no difficulty with speech or  language.     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 Lets give this more time to see how your weight does, so no changes. Stay on the 20mg dose. Follow up in 4 weeks for recheck. Try 1/2 or 1 tab hydroxyzine as needed to help you sleep. This is an allergy pill that makes you a bit sleepy. Ok to use every night if you need.     We discussed the option of increasing your fluoxetine dose if any continued weight loss over time.    Return at your earliest convenience for a complete physical, I believe this was last done in 2022.    Patient Care Team:  Vincent Armenta DO as PCP - General (Family Medicine)  Vincent Armenta DO as Primary Care Provider    Vincent Armenta DO   03/20/24   1:37 PM

## 2024-03-20 NOTE — PATIENT INSTRUCTIONS
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 Lets give this more time to see how your weight does, so no changes. Stay on the 20mg dose. Follow up in 4 weeks for recheck. Try 1/2 or 1 tab hydroxyzine as needed to help you sleep. This is an allergy pill that makes you a bit sleepy. Ok to use every night if you need.     Return at your earliest convenience for a complete physical, I believe this was last done in 2022.    Patient Care Team:  Vincent Armenta DO as PCP - General (Family Medicine)  Vincent Armenta DO as Primary Care Provider    Vincent Armenta DO   03/20/24   1:37 PM

## 2024-03-31 DIAGNOSIS — R63.0 ANOREXIA: ICD-10-CM

## 2024-03-31 DIAGNOSIS — F41.1 GENERALIZED ANXIETY DISORDER: ICD-10-CM

## 2024-03-31 DIAGNOSIS — R63.2 BINGE EATING: ICD-10-CM

## 2024-04-04 RX ORDER — FLUOXETINE HYDROCHLORIDE 20 MG/1
20 CAPSULE ORAL DAILY
Qty: 30 CAPSULE | Refills: 1 | OUTPATIENT
Start: 2024-04-04

## 2024-04-11 ENCOUNTER — OFFICE VISIT (OUTPATIENT)
Dept: PRIMARY CARE | Facility: CLINIC | Age: 34
End: 2024-04-11
Payer: COMMERCIAL

## 2024-04-11 VITALS
WEIGHT: 94 LBS | HEART RATE: 50 BPM | OXYGEN SATURATION: 99 % | TEMPERATURE: 96.7 F | SYSTOLIC BLOOD PRESSURE: 84 MMHG | DIASTOLIC BLOOD PRESSURE: 50 MMHG | HEIGHT: 66 IN | BODY MASS INDEX: 15.11 KG/M2

## 2024-04-11 DIAGNOSIS — Z00.00 ROUTINE HEALTH MAINTENANCE: Primary | ICD-10-CM

## 2024-04-11 DIAGNOSIS — E67.3 HIGH VITAMIN D LEVEL: ICD-10-CM

## 2024-04-11 DIAGNOSIS — R63.2 BINGE EATING: ICD-10-CM

## 2024-04-11 DIAGNOSIS — R79.89 HIGH SERUM VITAMIN B12: ICD-10-CM

## 2024-04-11 DIAGNOSIS — F41.1 GENERALIZED ANXIETY DISORDER: ICD-10-CM

## 2024-04-11 PROCEDURE — 99213 OFFICE O/P EST LOW 20 MIN: CPT | Performed by: FAMILY MEDICINE

## 2024-04-11 PROCEDURE — 99395 PREV VISIT EST AGE 18-39: CPT | Performed by: FAMILY MEDICINE

## 2024-04-11 PROCEDURE — 3008F BODY MASS INDEX DOCD: CPT | Performed by: FAMILY MEDICINE

## 2024-04-11 ASSESSMENT — PATIENT HEALTH QUESTIONNAIRE - PHQ9
SUM OF ALL RESPONSES TO PHQ QUESTIONS 1-9: 9
6. FEELING BAD ABOUT YOURSELF - OR THAT YOU ARE A FAILURE OR HAVE LET YOURSELF OR YOUR FAMILY DOWN: SEVERAL DAYS
7. TROUBLE CONCENTRATING ON THINGS, SUCH AS READING THE NEWSPAPER OR WATCHING TELEVISION: NOT AT ALL
4. FEELING TIRED OR HAVING LITTLE ENERGY: SEVERAL DAYS
9. THOUGHTS THAT YOU WOULD BE BETTER OFF DEAD, OR OF HURTING YOURSELF: NOT AT ALL
8. MOVING OR SPEAKING SO SLOWLY THAT OTHER PEOPLE COULD HAVE NOTICED. OR THE OPPOSITE, BEING SO FIGETY OR RESTLESS THAT YOU HAVE BEEN MOVING AROUND A LOT MORE THAN USUAL: SEVERAL DAYS
SUM OF ALL RESPONSES TO PHQ9 QUESTIONS 1 AND 2: 2
5. POOR APPETITE OR OVEREATING: SEVERAL DAYS
2. FEELING DOWN, DEPRESSED OR HOPELESS: SEVERAL DAYS
1. LITTLE INTEREST OR PLEASURE IN DOING THINGS: SEVERAL DAYS
10. IF YOU CHECKED OFF ANY PROBLEMS, HOW DIFFICULT HAVE THESE PROBLEMS MADE IT FOR YOU TO DO YOUR WORK, TAKE CARE OF THINGS AT HOME, OR GET ALONG WITH OTHER PEOPLE: SOMEWHAT DIFFICULT
3. TROUBLE FALLING OR STAYING ASLEEP OR SLEEPING TOO MUCH: NEARLY EVERY DAY

## 2024-04-11 ASSESSMENT — PROMIS GLOBAL HEALTH SCALE
RATE_PHYSICAL_HEALTH: GOOD
RATE_QUALITY_OF_LIFE: GOOD
RATE_MENTAL_HEALTH: GOOD
CARRYOUT_PHYSICAL_ACTIVITIES: COMPLETELY
RATE_GENERAL_HEALTH: GOOD
RATE_SOCIAL_SATISFACTION: GOOD
EMOTIONAL_PROBLEMS: OFTEN
RATE_AVERAGE_FATIGUE: MODERATE
RATE_AVERAGE_PAIN: 0
CARRYOUT_SOCIAL_ACTIVITIES: GOOD

## 2024-04-11 ASSESSMENT — ANXIETY QUESTIONNAIRES
4. TROUBLE RELAXING: SEVERAL DAYS
GAD7 TOTAL SCORE: 7
2. NOT BEING ABLE TO STOP OR CONTROL WORRYING: SEVERAL DAYS
5. BEING SO RESTLESS THAT IT IS HARD TO SIT STILL: SEVERAL DAYS
1. FEELING NERVOUS, ANXIOUS, OR ON EDGE: SEVERAL DAYS
3. WORRYING TOO MUCH ABOUT DIFFERENT THINGS: SEVERAL DAYS
6. BECOMING EASILY ANNOYED OR IRRITABLE: SEVERAL DAYS
7. FEELING AFRAID AS IF SOMETHING AWFUL MIGHT HAPPEN: SEVERAL DAYS
IF YOU CHECKED OFF ANY PROBLEMS ON THIS QUESTIONNAIRE, HOW DIFFICULT HAVE THESE PROBLEMS MADE IT FOR YOU TO DO YOUR WORK, TAKE CARE OF THINGS AT HOME, OR GET ALONG WITH OTHER PEOPLE: SOMEWHAT DIFFICULT

## 2024-04-11 ASSESSMENT — PAIN SCALES - GENERAL: PAINLEVEL: 0-NO PAIN

## 2024-04-11 NOTE — PROGRESS NOTES
Outpatient Visit Note    Chief Complaint   Patient presents with    Annual Exam     Physical, no further concerns.     Follow-up     Med follow up, no further concerns.        HPI:  Luanne Mahan is a 33 y.o. female here  for a complete physical.    Having mood coverage but a lot of insomnia on the higher Prozac dose. One hydroxyzine was a bit much, has not tried a smaller amount.     Health Maintenance.  Immunizations: Tdap is due 2030. Does not want any more HPV vaccines.     Denies smoking or illicit drug use, drinks 2 alcoholic beverages a week. Patient reports routine vision checks and dental cleanings, and regular exercise with going to the gym. Patient is fasting for routine blood work today.    No Fhx colon, or breast cancer. Maternal grandmother with ovarian cancer maybe later in life. Screening pap done 2022    Otherwise denies fevers, chills, cold/flu symptoms, SOB, CP, N/V, abdominal pain, dysuria, hematuria, melena, diarrhea or LE edema      PHQ9/GAD7:  Over the past 2 weeks, how often have you been bothered by any of the following problems?  Trouble falling or staying asleep, or sleeping too much: Nearly every day  Feeling tired or having little energy: Several days  Poor appetite or overeating: Several days  Feeling bad about yourself - or that you are a failure or have let yourself or your family down: Several days  Trouble concentrating on things, such as reading the newspaper or watching television: Not at all  Moving or speaking so slowly that other people could have noticed? Or the opposite - being so fidgety or restless that you have been moving around a lot more than usual.: Several days  Thoughts that you would be better off dead or hurting yourself in some way: Not at all  Patient Health Questionnaire-9 Score: 9  Over the last 2 weeks, how often have you been bothered by any of the following problems?  Feeling nervous, anxious, or on edge: Several days  Not being able to stop  or control worrying: Several days  Worrying too much about different things: Several days  Trouble relaxing: Several days  Being so restless that it is hard to sit still: Several days  Becoming easily annoyed or irritable: Several days  Feeling afraid as if something awful might happen: Several days  ANTHONY-7 Total Score: 7      Patient Active Problem List    Diagnosis Date Noted    Mild major depression (CMS-HCC) 10/27/2023    Amenorrhea 10/26/2023    Cervical high risk human papillomavirus (HPV) DNA test positive 10/26/2023    Female cystocele 10/26/2023    Fibrocystic breast disease (FCBD) 10/26/2023    Leukopenia 10/26/2023    Paresthesias 10/26/2023    Reflux gastritis 10/26/2023    Dysplasia of cervix, high grade WAQAR 2 02/05/2018        Past Medical History:   Diagnosis Date    Allergic     Anxiety     Depression     Eczema     Headache     OCD (obsessive compulsive disorder)         Current Medications  Current Outpatient Medications   Medication Instructions    APPLE CIDER VINEGAR ORAL 100 mg, oral, Daily    collagen, hydr (bovine) (bulk) 100 %, miscellaneous, Daily    FLUoxetine (PROZAC) 40 mg, oral, Daily    hydrOXYzine HCL (ATARAX) 10 mg, oral, Nightly PRN    MULTIVITAMIN ORAL 1 tablet, oral, Daily        Allergies  Allergies   Allergen Reactions    Cat Dander Shortness of breath and Runny nose    Loracarbef Hives and Itching        Immunizations  Immunization History   Administered Date(s) Administered    DTP 01/06/1992, 03/31/1992, 05/26/1992    DTaP, Unspecified 05/28/1993, 06/06/1997    HPV 9-valent vaccine (GARDASIL 9) 07/09/2019    Hepatitis B vaccine, pediatric/adolescent (RECOMBIVAX, ENGERIX) 06/13/1997, 07/11/1997, 01/09/1998    HiB, unspecified 01/06/1992, 03/31/1992, 05/26/1992, 01/29/1993    Influenza, Unspecified 10/28/2016, 10/19/2017    Influenza, injectable, quadrivalent 10/12/2018, 11/03/2020    Influenza, seasonal, injectable 10/27/2014, 03/06/2015    Influenza, seasonal, injectable,  preservative free 06/17/2017    MMR vaccine, subcutaneous (MMR II) 01/29/1993, 07/21/2004    Polio, Unspecified 01/06/1992, 03/31/1992, 05/28/1993, 06/06/1997    Td (adult), unspecified 07/21/2004    Tdap vaccine, age 7 year and older (BOOSTRIX, ADACEL) 01/31/2020    Varicella vaccine, subcutaneous (VARIVAX) 01/09/1998        Past Surgical History:   Procedure Laterality Date    WISDOM TOOTH EXTRACTION       Family History   Problem Relation Name Age of Onset    Panic attack Mother Teresa stovall bunsonal     Arthritis Mother Teresa stovall bunsonal     Depression Mother Teresa stovall bunsonal     Genetic Testing Mother Teresa stovall bunsonal     Hypertension Mother Teresa stovall bunsonal     Mental illness Mother Teresa stovall bunsonal     Miscarriages / Stillbirths Mother Teresa stovall bunsonal     Diabetes Father Delonte Bunjevac     Congenital heart disease Father Delonte Bunjevac     Hernia Father Delonte Bunjevac     No Known Problems Sister      No Known Problems Brother      No Known Problems Son      Diabetes Maternal Grandmother Bare Valentic     Breast cancer Maternal Grandmother Bare Valentic     Cancer Maternal Grandmother Bare Valentic     Hypertension Maternal Grandfather      Heart disease Paternal Grandmother Berta     Stroke Paternal Grandmother Berta     Heart disease Paternal Grandfather Marty bunjevac     Blood clot Paternal Grandfather [place] bunjevac     Hearing loss Paternal Grandfather [place] bunjevac      Social History     Tobacco Use    Smoking status: Never    Smokeless tobacco: Never   Vaping Use    Vaping status: Never Used   Substance Use Topics    Alcohol use: Never    Drug use: Never     Tobacco Use: Low Risk  (4/11/2024)    Patient History     Smoking Tobacco Use: Never     Smokeless Tobacco Use: Never     Passive Exposure: Not on file        ROS  All pertinent positive symptoms are included in the history of present illness.  All other systems have been reviewed and are negative and noncontributory to this patient's current  ailments.    VITAL SIGNS  Vitals:    04/11/24 1133   BP: 84/50   Pulse: 50   Temp: 35.9 °C (96.7 °F)   SpO2: 99%     Vitals:    04/11/24 1133   Weight: (!) 42.6 kg (94 lb)      Body mass index is 15.4 kg/m².     PHYSICAL EXAM  GENERAL APPEARANCE: well nourished, well developed, looks like stated age, in no acute distress, not ill or tired appearing, conversing well.   HEENT: no trauma, normocephalic. PERRLA and EOMI with normal external exam. TM's intact with no injection or effusion, no signs of infection. Nares patent, turbinates pink without discharge. Pharynx pink with no exudates or lesions, no enlarged tonsils.   NECK: no nodes, supple without rigidity, no neck mass was observed, no thyromegaly or thyroid nodules.   HEART: regular rate and rhythm, S1 and S2 heard with no murmurs or skipped beats, no carotid bruits.   LUNGS: clear to auscultation bilaterally with no wheezes, crackles or rales.   ABDOMEN: no organomegaly, soft, nontender, nondistended, normal bowel sounds, no guarding/rebound/rigidity.   EXTREMITIES: moving all extremities equally with no edema or deformities.   SKIN: normal skin color and pigmentation, normal skin turgor without rash, lesions, or nodules visualized.   NEUROLOGIC EXAM: CN II-XII grossly intact, normal gait, normal balance, 5/5 muscle strength, sensation grossly intact.   PSYCH: mood and affect appropriate; alert and oriented to time, place, person; no difficulty with speech or language.   LYMPH NODES: no cervical lymphadenopathy.         Counseling:       Medication education:           Education:  The patient is counseled regarding potential side-effects of all new medications          Understanding:  Patient expressed understanding of information conveyed today          Adherence:  No barriers to adherence identified      Assessment/Plan   Problem List Items Addressed This Visit    None  Visit Diagnoses         Codes    Routine health maintenance    -  Primary Z00.00     Relevant Orders    Comprehensive Metabolic Panel    Lipid Panel    CBC    TSH with reflex to Free T4 if abnormal    Generalized anxiety disorder     F41.1    Binge eating     R63.2    High vitamin D level     E67.3    Relevant Orders    Vitamin D 25-Hydroxy,Total (for eval of Vitamin D levels)    High serum vitamin B12     R79.89    Relevant Orders    Vitamin B12            Additional Visit Plans:  - Complete history and physical examination was performed      GENERAL RECOMMENDATIONS:  - I encourage you to eat a low-fat, moderate-carbohydrate, low-calorie diet to maintain a normal BMI (under 25) to reduce heart disease, and risk for diabetes  - Moderate intensity exercise for 30 minutes 5 days per week is recommended  - Helpful resources recommended by the American Academy of Family Practice can be found at www.familydoctor.org or www.choosemyplate.gov  - Can also consider enrolling in a program such as Weight Watchers or Nila Bellaig. The most effective diet is going to be one you can follow long term and turn into a lifestyle. The CDC recommends losing 0.5-2 lbs a week if overweight or obese.  - I recommend a routine vision check and dental cleanings every 6 months      BLOOD TESTING:  - We will obtain routine blood work, please have this done when you are fasting. The office will notify you of the test results once they are available and make any treatment recommendations accordingly  - Blood work may include a cholesterol and diabetes screen if risk factors exist (overweight, high blood pressure etc); screening for sexually transmitted infections; and Hepatitis C Virus screening      VACCINATION RECOMMENDATIONS:  - Flu shot annually.  - Tetanus booster every 10 years. Next due 2030  - HPV vaccine. You do not want anymore  - Two pneumonia vaccinations starting at 65 years old (or earlier if risk factors - smoker, diabetic, heart or lung conditions) - not due yet.  - Shingles vaccine for those 50 years or older -  not due yet.      SCREENING RECOMMENDATIONS:  - Cervical cancer screening (pap test) in women starting at age 21 until age 65 years old. Due next year  - Mammogram screening for breast cancer in women starting at 40-50 years and every 1-2 years - not due yet.  - Bone density screening (DEXA) for osteoporosis in women aged 65 years and older (in younger women who are higher risk) - not due yet.  - Colon cancer screening (with colonoscopy or Cologuard) for men and women starting at age 45 until 74 years old (or earlier if family history of colon cancer) - not due yet.      Plan to give the Prozac some more time to work and for your body and sleep to adjust. Try 1/2 or 1/4 hydroxyzine as needed. Follow up in 2-6 weeks.     Next Wellness Exam/Annual Physical Due  In 1 year from today    Patient Care Team:  Vincent Armenta DO as PCP - General (Family Medicine)  Vincent Armenta DO as Primary Care Provider    Vincent Armenta DO   04/16/24   9:53 AM

## 2024-04-11 NOTE — PATIENT INSTRUCTIONS
Problem List Items Addressed This Visit    None  Visit Diagnoses         Codes    Routine health maintenance    -  Primary Z00.00    Relevant Orders    Comprehensive Metabolic Panel    Lipid Panel    CBC    TSH with reflex to Free T4 if abnormal    Generalized anxiety disorder     F41.1    Binge eating     R63.2    High vitamin D level     E67.3    Relevant Orders    Vitamin D 25-Hydroxy,Total (for eval of Vitamin D levels)    High serum vitamin B12     R79.89    Relevant Orders    Vitamin B12            Additional Visit Plans:  - Complete history and physical examination was performed      GENERAL RECOMMENDATIONS:  - I encourage you to eat a low-fat, moderate-carbohydrate, low-calorie diet to maintain a normal BMI (under 25) to reduce heart disease, and risk for diabetes  - Moderate intensity exercise for 30 minutes 5 days per week is recommended  - Helpful resources recommended by the American Academy of Family Practice can be found at www.familydoctor.org or www.choosemyplate.gov  - Can also consider enrolling in a program such as Weight Watchers or Nila Bellaig. The most effective diet is going to be one you can follow long term and turn into a lifestyle. The CDC recommends losing 0.5-2 lbs a week if overweight or obese.  - I recommend a routine vision check and dental cleanings every 6 months      BLOOD TESTING:  - We will obtain routine blood work, please have this done when you are fasting. The office will notify you of the test results once they are available and make any treatment recommendations accordingly  - Blood work may include a cholesterol and diabetes screen if risk factors exist (overweight, high blood pressure etc); screening for sexually transmitted infections; and Hepatitis C Virus screening      VACCINATION RECOMMENDATIONS:  - Flu shot annually.  - Tetanus booster every 10 years. Next due 2030  - HPV vaccine. You do not want anymore  - Two pneumonia vaccinations starting at 65 years old (or  earlier if risk factors - smoker, diabetic, heart or lung conditions) - not due yet.  - Shingles vaccine for those 50 years or older - not due yet.      SCREENING RECOMMENDATIONS:  - Cervical cancer screening (pap test) in women starting at age 21 until age 65 years old. Due next year  - Mammogram screening for breast cancer in women starting at 40-50 years and every 1-2 years - not due yet.  - Bone density screening (DEXA) for osteoporosis in women aged 65 years and older (in younger women who are higher risk) - not due yet.  - Colon cancer screening (with colonoscopy or Cologuard) for men and women starting at age 45 until 74 years old (or earlier if family history of colon cancer) - not due yet.      Plan to give the Prozac some more time to work and for your body and sleep to adjust. Try 1/2 or 1/4 hydroxyzine as needed. Follow up in 2-6 weeks.     Next Wellness Exam/Annual Physical Due  In 1 year from today    Patient Care Team:  Vincent Armenta DO as PCP - General (Family Medicine)  Vincent Armenta DO as Primary Care Provider    Vincent Armenta DO   04/11/24   12:31 PM

## 2024-04-20 DIAGNOSIS — F41.1 GENERALIZED ANXIETY DISORDER: ICD-10-CM

## 2024-04-22 NOTE — TELEPHONE ENCOUNTER
Rx request received  Pharmacy populated  Last appmt physical/med f/u 4/11/24  Rx request for 90 days

## 2024-04-25 RX ORDER — FLUOXETINE HYDROCHLORIDE 40 MG/1
40 CAPSULE ORAL DAILY
Qty: 90 CAPSULE | Refills: 0 | Status: SHIPPED | OUTPATIENT
Start: 2024-04-25

## 2024-04-30 ENCOUNTER — TELEPHONE (OUTPATIENT)
Dept: PRIMARY CARE | Facility: CLINIC | Age: 34
End: 2024-04-30
Payer: COMMERCIAL

## 2024-04-30 NOTE — TELEPHONE ENCOUNTER
She needs to discuss how she is feeling with the prozac, doesn't feel it is working for her anxiety, binging, etc. Discuss possible change to Vyvanse.  She said she is on a 3 day streak where her anxiety and depression are creeping and she said the binging is getting uncontrollable.

## 2024-05-02 NOTE — TELEPHONE ENCOUNTER
No openings until 5/24/24. Please advise if she is okay to wait until then or if she is able to be squeezed in somewhere as telemed.

## 2024-05-10 ENCOUNTER — APPOINTMENT (OUTPATIENT)
Dept: PRIMARY CARE | Facility: CLINIC | Age: 34
End: 2024-05-10
Payer: COMMERCIAL

## 2024-05-10 ENCOUNTER — OFFICE VISIT (OUTPATIENT)
Dept: PRIMARY CARE | Facility: CLINIC | Age: 34
End: 2024-05-10
Payer: COMMERCIAL

## 2024-05-10 VITALS
RESPIRATION RATE: 18 BRPM | TEMPERATURE: 98.5 F | HEIGHT: 66 IN | OXYGEN SATURATION: 98 % | SYSTOLIC BLOOD PRESSURE: 98 MMHG | BODY MASS INDEX: 15.98 KG/M2 | HEART RATE: 65 BPM | DIASTOLIC BLOOD PRESSURE: 68 MMHG | WEIGHT: 99.4 LBS

## 2024-05-10 DIAGNOSIS — E46 PROTEIN-CALORIE MALNUTRITION, UNSPECIFIED SEVERITY (MULTI): ICD-10-CM

## 2024-05-10 DIAGNOSIS — R63.2 BINGE EATING: Primary | ICD-10-CM

## 2024-05-10 DIAGNOSIS — R63.2 BINGE EATING: ICD-10-CM

## 2024-05-10 DIAGNOSIS — F41.1 GENERALIZED ANXIETY DISORDER: ICD-10-CM

## 2024-05-10 PROCEDURE — 99214 OFFICE O/P EST MOD 30 MIN: CPT | Performed by: FAMILY MEDICINE

## 2024-05-10 PROCEDURE — 93005 ELECTROCARDIOGRAM TRACING: CPT | Performed by: FAMILY MEDICINE

## 2024-05-10 PROCEDURE — 1036F TOBACCO NON-USER: CPT | Performed by: FAMILY MEDICINE

## 2024-05-10 PROCEDURE — 93000 ELECTROCARDIOGRAM COMPLETE: CPT | Performed by: FAMILY MEDICINE

## 2024-05-10 PROCEDURE — 3008F BODY MASS INDEX DOCD: CPT | Performed by: FAMILY MEDICINE

## 2024-05-10 RX ORDER — LISDEXAMFETAMINE DIMESYLATE CAPSULES 10 MG/1
10 CAPSULE ORAL DAILY
Qty: 30 CAPSULE | Refills: 0 | Status: SHIPPED | OUTPATIENT
Start: 2024-05-10 | End: 2024-06-09

## 2024-05-10 RX ORDER — LISDEXAMFETAMINE DIMESYLATE CAPSULES 10 MG/1
10 CAPSULE ORAL DAILY
Qty: 30 CAPSULE | Refills: 0 | Status: SHIPPED | OUTPATIENT
Start: 2024-05-10 | End: 2024-05-10 | Stop reason: SDUPTHER

## 2024-05-10 ASSESSMENT — PATIENT HEALTH QUESTIONNAIRE - PHQ9
2. FEELING DOWN, DEPRESSED OR HOPELESS: SEVERAL DAYS
9. THOUGHTS THAT YOU WOULD BE BETTER OFF DEAD, OR OF HURTING YOURSELF: NOT AT ALL
10. IF YOU CHECKED OFF ANY PROBLEMS, HOW DIFFICULT HAVE THESE PROBLEMS MADE IT FOR YOU TO DO YOUR WORK, TAKE CARE OF THINGS AT HOME, OR GET ALONG WITH OTHER PEOPLE: SOMEWHAT DIFFICULT
6. FEELING BAD ABOUT YOURSELF - OR THAT YOU ARE A FAILURE OR HAVE LET YOURSELF OR YOUR FAMILY DOWN: NEARLY EVERY DAY
5. POOR APPETITE OR OVEREATING: NEARLY EVERY DAY
8. MOVING OR SPEAKING SO SLOWLY THAT OTHER PEOPLE COULD HAVE NOTICED. OR THE OPPOSITE, BEING SO FIGETY OR RESTLESS THAT YOU HAVE BEEN MOVING AROUND A LOT MORE THAN USUAL: NOT AT ALL
3. TROUBLE FALLING OR STAYING ASLEEP: NEARLY EVERY DAY
SUM OF ALL RESPONSES TO PHQ9 QUESTIONS 1 & 2: 2
1. LITTLE INTEREST OR PLEASURE IN DOING THINGS: SEVERAL DAYS
7. TROUBLE CONCENTRATING ON THINGS, SUCH AS READING THE NEWSPAPER OR WATCHING TELEVISION: NOT AT ALL
4. FEELING TIRED OR HAVING LITTLE ENERGY: MORE THAN HALF THE DAYS
SUM OF ALL RESPONSES TO PHQ QUESTIONS 1-9: 13

## 2024-05-10 ASSESSMENT — ENCOUNTER SYMPTOMS
OCCASIONAL FEELINGS OF UNSTEADINESS: 0
LOSS OF SENSATION IN FEET: 0
DEPRESSION: 0

## 2024-05-10 ASSESSMENT — COLUMBIA-SUICIDE SEVERITY RATING SCALE - C-SSRS
2. HAVE YOU ACTUALLY HAD ANY THOUGHTS OF KILLING YOURSELF?: NO
1. IN THE PAST MONTH, HAVE YOU WISHED YOU WERE DEAD OR WISHED YOU COULD GO TO SLEEP AND NOT WAKE UP?: NO
6. HAVE YOU EVER DONE ANYTHING, STARTED TO DO ANYTHING, OR PREPARED TO DO ANYTHING TO END YOUR LIFE?: NO

## 2024-05-10 ASSESSMENT — ANXIETY QUESTIONNAIRES
2. NOT BEING ABLE TO STOP OR CONTROL WORRYING: SEVERAL DAYS
4. TROUBLE RELAXING: SEVERAL DAYS
3. WORRYING TOO MUCH ABOUT DIFFERENT THINGS: SEVERAL DAYS
5. BEING SO RESTLESS THAT IT IS HARD TO SIT STILL: SEVERAL DAYS
7. FEELING AFRAID AS IF SOMETHING AWFUL MIGHT HAPPEN: NOT AT ALL
6. BECOMING EASILY ANNOYED OR IRRITABLE: SEVERAL DAYS
IF YOU CHECKED OFF ANY PROBLEMS ON THIS QUESTIONNAIRE, HOW DIFFICULT HAVE THESE PROBLEMS MADE IT FOR YOU TO DO YOUR WORK, TAKE CARE OF THINGS AT HOME, OR GET ALONG WITH OTHER PEOPLE: SOMEWHAT DIFFICULT
GAD7 TOTAL SCORE: 6
1. FEELING NERVOUS, ANXIOUS, OR ON EDGE: SEVERAL DAYS

## 2024-05-10 ASSESSMENT — PAIN SCALES - GENERAL: PAINLEVEL: 0-NO PAIN

## 2024-05-10 NOTE — TELEPHONE ENCOUNTER
Patient does not want to go back on birth control. She would like to try the good RX coupon. Could you please send it to [facility] in [place].  PL

## 2024-05-10 NOTE — PROGRESS NOTES
Outpatient Visit Note    Chief Complaint   Patient presents with    Eating Disorder       HPI:  Luanne Mahan is a 33 y.o. female here to follow-up on her binge eating disorder.    Since her last visit she has been experiencing heightened anxiety triggered by her gaining weight rapidly from uncontrolled binge eating.    She feels her appetite is out of control.     We have discussed considering the addition of topiramate with birth control but patient does not want to be on birth control at this time.    We have had a discussion today about considering low-dose of Vyvanse as an initial treatment in combination with her other medication.    She is attending counseling regularly.  Her therapist does recommend she consider having more extensive lab work done to check for any deficiencies as she is craving a lot of salt as of late.    She still has other outstanding blood work to complete.  Labs last checked in 2023    She is up 5 lbs since last visit.     She is trying to eat more protein as of late consciously. She never feels full.     PHQ9/GAD7:  Over the past 2 weeks, how often have you been bothered by any of the following problems?  Trouble falling or staying asleep, or sleeping too much: Nearly every day  Feeling tired or having little energy: More than half the days  Poor appetite or overeating: Nearly every day  Feeling bad about yourself - or that you are a failure or have let yourself or your family down: Nearly every day  Trouble concentrating on things, such as reading the newspaper or watching television: Not at all  Moving or speaking so slowly that other people could have noticed? Or the opposite - being so fidgety or restless that you have been moving around a lot more than usual.: Not at all  Thoughts that you would be better off dead or hurting yourself in some way: Not at all  Patient Health Questionnaire-9 Score: 13  Over the last 2 weeks, how often have you been bothered by any of the  following problems?  Feeling nervous, anxious, or on edge: Several days  Not being able to stop or control worrying: Several days  Worrying too much about different things: Several days  Trouble relaxing: Several days  Being so restless that it is hard to sit still: Several days  Becoming easily annoyed or irritable: Several days  Feeling afraid as if something awful might happen: Not at all  ANTHONY-7 Total Score: 6      Past Medical History:   Diagnosis Date    Allergic     Anxiety     Depression     Eczema     Headache     OCD (obsessive compulsive disorder)         Current Medications  Current Outpatient Medications   Medication Instructions    APPLE CIDER VINEGAR ORAL 100 mg, oral, Daily    collagen, hydr (bovine) (bulk) 100 %, miscellaneous, Daily    FLUoxetine (PROZAC) 40 mg, oral, Daily    lisdexamfetamine (VYVANSE) 10 mg, oral, Daily    MULTIVITAMIN ORAL 1 tablet, oral, Daily        Allergies  Allergies   Allergen Reactions    Cat Dander Shortness of breath and Runny nose    Loracarbef Hives and Itching        Past Surgical History:   Procedure Laterality Date    WISDOM TOOTH EXTRACTION       Family History   Problem Relation Name Age of Onset    Panic attack Mother Teresa barbakeeley     Arthritis Mother Teresa barbakeeley     Depression Mother Teresa barbakeeley     Genetic Testing Mother Teresa enrique     Hypertension Mother Teresa enrique     Mental illness Mother Teresa enrique     Miscarriages / Stillbirths Mother Teresa enrique     Diabetes Father Delonte Chowdhurysonal     Congenital heart disease Father Delonte Chowdhurysonal     Hernia Father Delonte Chowdhurysonal     No Known Problems Sister      No Known Problems Brother      No Known Problems Son      Diabetes Maternal Grandmother Bare Valentic     Breast cancer Maternal Grandmother Bare Valentic     Cancer Maternal Grandmother Bare Valentic     Hypertension Maternal Grandfather      Heart disease Paternal Grandmother Berta     Stroke Paternal Grandmother Berta      Heart disease Paternal Grandfather Marty enrique     Blood clot Paternal Grandfather Marty enrique     Hearing loss Paternal Grandfather Marty enrique      Social History     Tobacco Use    Smoking status: Never    Smokeless tobacco: Never   Vaping Use    Vaping status: Never Used   Substance Use Topics    Alcohol use: Not Currently    Drug use: Never     Tobacco Use: Low Risk  (5/10/2024)    Patient History     Smoking Tobacco Use: Never     Smokeless Tobacco Use: Never     Passive Exposure: Not on file        ROS  All pertinent positive symptoms are included in the history of present illness.  All other systems have been reviewed and are negative and noncontributory to this patient's current ailments.    VITAL SIGNS  Vitals:    05/10/24 0914   BP: 98/68   Pulse: 65   Resp: 18   Temp: 36.9 °C (98.5 °F)   SpO2: 98%     Vitals:    05/10/24 0914   Weight: 45.1 kg (99 lb 6.4 oz)      Body mass index is 16.29 kg/m².     PHYSICAL EXAM  GENERAL APPEARANCE: well nourished, well developed, looks like stated age, in no acute distress, not ill or tired appearing, conversing well.   HEENT: no trauma, normocephalic.   NECK: supple without rigidity, no neck mass was observed.   LUNGS: good chest wall expansion. In no acute respiratory distress.   EXTREMITIES: moving all extremities equally with no edema.   SKIN: normal skin color and pigmentation, without rash.   NEUROLOGIC EXAM: CN II-XII grossly intact, normal gait.   PSYCH: mood and affect appropriate; alert and oriented to time, place, person; no difficulty with speech or language.     Counseling:       Medication education:           Education:  The patient is counseled regarding potential side-effects of all new medications          Understanding:  Patient expressed understanding of information conveyed today          Adherence:  No barriers to adherence identified     Assessment/Plan   Problem List Items Addressed This Visit    None  Visit Diagnoses         Codes     Binge eating    -  Primary R63.2    Relevant Medications    lisdexamfetamine (Vyvanse) 10 MG capsule    Other Relevant Orders    Iron and TIBC    Ferritin    Zinc    ECG 12 lead (Clinic Performed) (Completed)    Generalized anxiety disorder     F41.1    Relevant Medications    lisdexamfetamine (Vyvanse) 10 MG capsule    Other Relevant Orders    Iron and TIBC    Ferritin    Zinc    ECG 12 lead (Clinic Performed) (Completed)    Protein-calorie malnutrition, unspecified severity (Multi)     E46    Relevant Orders    Iron and TIBC    Ferritin    Zinc    ECG 12 lead (Clinic Performed) (Completed)    Referral to Gynecology            Additional Visit Plans:  Please have my previous blood work done along with these new added nutritional components    Will try a small dose of vyvanse to see if this can offset the absence of feeling full while staying on the Prozac.    Referral to Dr. Hwang for hormone testing per your interest.     I think a good goal is to try and maintain 100 lb.       Patient Care Team:  Vincent Armenta DO as PCP - General (Family Medicine)  Vincent Armenta DO as Primary Care Provider    Vincent Armenta DO   05/10/24   10:05 AM

## 2024-05-10 NOTE — PATIENT INSTRUCTIONS
Problem List Items Addressed This Visit    None  Visit Diagnoses         Codes    Binge eating    -  Primary R63.2    Relevant Orders    Iron and TIBC    Ferritin    Zinc    ECG 12 lead (Clinic Performed) (Completed)    Generalized anxiety disorder     F41.1    Relevant Orders    Iron and TIBC    Ferritin    Zinc    ECG 12 lead (Clinic Performed) (Completed)    Protein-calorie malnutrition, unspecified severity (Multi)     E46    Relevant Orders    Iron and TIBC    Ferritin    Zinc    ECG 12 lead (Clinic Performed) (Completed)            Additional Visit Plans:  Please have my previous blood work done along with these new added nutritional components    Will try a small dose of vyvanse to see if this can offset the absence of feeling full while staying on the Prozac.    Referral to Dr. Hwang for hormone testing per your interest.       Patient Care Team:  Vincent Armenta DO as PCP - General (Family Medicine)  Vincent Armenta DO as Primary Care Provider    Vincent Armenta DO   05/10/24   9:49 AM

## 2024-05-13 NOTE — TELEPHONE ENCOUNTER
Luanne called as her insurance denied her Vyvanse medication. She was told that she would be able to pay out of pocket. However it is not at a affordable price. She is asking if something similar to Vyvanse could be called in instead. I am unsure if a PA ever came over for Vyvanse. I did not see anything.

## 2024-05-14 ENCOUNTER — PATIENT MESSAGE (OUTPATIENT)
Dept: PRIMARY CARE | Facility: CLINIC | Age: 34
End: 2024-05-14
Payer: COMMERCIAL

## 2024-05-14 NOTE — PATIENT COMMUNICATION
"Spoke with patient and she states that she didn't get the Vyvanse either as her therapist had suggested that may be to much for her and may send her \"spiraling\", she states that they did suggest a medication that is used for headaches but it helps with the binging  but she can't remember the name of it. Please advised what your think she should do.  PL  "

## 2024-05-17 NOTE — PATIENT COMMUNICATION
Left detailed message on VM with SM message. Advised to call back if decides which one she wants to do or with any further questions . pL

## 2024-06-03 ENCOUNTER — TELEPHONE (OUTPATIENT)
Dept: PRIMARY CARE | Facility: CLINIC | Age: 34
End: 2024-06-03
Payer: COMMERCIAL

## 2024-06-03 NOTE — TELEPHONE ENCOUNTER
Pt cancelled appt for tomorrow. She said she did not start the Vyvance, because it was too expensive with her insurance. She said she still can't control her binge eating, and has gained 5-6 pounds since she saw Dr Kaur last. Is there anything else DR hackett recommend that is more affordable?

## 2024-06-04 ENCOUNTER — PATIENT MESSAGE (OUTPATIENT)
Dept: PRIMARY CARE | Facility: CLINIC | Age: 34
End: 2024-06-04
Payer: COMMERCIAL

## 2024-06-04 ENCOUNTER — APPOINTMENT (OUTPATIENT)
Dept: PRIMARY CARE | Facility: CLINIC | Age: 34
End: 2024-06-04
Payer: COMMERCIAL

## 2024-06-04 NOTE — TELEPHONE ENCOUNTER
Ask er to call her insurance to find out if a different dose of vyvanse is preferred, or adderall.

## 2024-06-05 ENCOUNTER — APPOINTMENT (OUTPATIENT)
Dept: OBSTETRICS AND GYNECOLOGY | Facility: CLINIC | Age: 34
End: 2024-06-05
Payer: COMMERCIAL

## 2024-06-19 PROCEDURE — 88175 CYTOPATH C/V AUTO FLUID REDO: CPT

## 2024-06-19 PROCEDURE — 87624 HPV HI-RISK TYP POOLED RSLT: CPT

## 2024-06-21 ENCOUNTER — LAB REQUISITION (OUTPATIENT)
Dept: LAB | Facility: HOSPITAL | Age: 34
End: 2024-06-21
Payer: COMMERCIAL

## 2024-06-21 DIAGNOSIS — Z11.51 ENCOUNTER FOR SCREENING FOR HUMAN PAPILLOMAVIRUS (HPV): ICD-10-CM

## 2024-06-21 DIAGNOSIS — R87.610 ATYPICAL SQUAMOUS CELLS OF UNDETERMINED SIGNIFICANCE ON CYTOLOGIC SMEAR OF CERVIX (ASC-US): ICD-10-CM

## 2024-07-01 LAB
CYTOLOGY CMNT CVX/VAG CYTO-IMP: NORMAL
HPV HR 12 DNA GENITAL QL NAA+PROBE: NEGATIVE
HPV HR GENOTYPES PNL CVX NAA+PROBE: POSITIVE
HPV16 DNA SPEC QL NAA+PROBE: NEGATIVE
HPV18 DNA SPEC QL NAA+PROBE: POSITIVE
LAB AP HPV GENOTYPE QUESTION: YES
LAB AP HPV HR: NORMAL
LAB AP PREVIOUS ABNORMAL HISTORY: NORMAL
LABORATORY COMMENT REPORT: NORMAL
LMP START DATE: NORMAL
PATH REPORT.TOTAL CANCER: NORMAL

## 2024-07-18 ENCOUNTER — LAB (OUTPATIENT)
Dept: LAB | Facility: LAB | Age: 34
End: 2024-07-18
Payer: COMMERCIAL

## 2024-07-18 DIAGNOSIS — E67.3 HIGH VITAMIN D LEVEL: ICD-10-CM

## 2024-07-18 DIAGNOSIS — R63.5 WEIGHT GAIN: ICD-10-CM

## 2024-07-18 DIAGNOSIS — Z00.00 ROUTINE HEALTH MAINTENANCE: ICD-10-CM

## 2024-07-18 DIAGNOSIS — E46 PROTEIN-CALORIE MALNUTRITION, UNSPECIFIED SEVERITY (MULTI): ICD-10-CM

## 2024-07-18 DIAGNOSIS — R79.89 HIGH SERUM VITAMIN B12: ICD-10-CM

## 2024-07-18 DIAGNOSIS — F41.1 GENERALIZED ANXIETY DISORDER: ICD-10-CM

## 2024-07-18 DIAGNOSIS — M79.89 LOCALIZED SWELLING OF LOWER EXTREMITY: ICD-10-CM

## 2024-07-18 DIAGNOSIS — R63.2 BINGE EATING: ICD-10-CM

## 2024-07-18 LAB
25(OH)D3 SERPL-MCNC: 54 NG/ML (ref 30–100)
ALBUMIN SERPL BCP-MCNC: 4 G/DL (ref 3.4–5)
ALP SERPL-CCNC: 68 U/L (ref 33–110)
ALT SERPL W P-5'-P-CCNC: 52 U/L (ref 7–45)
ANION GAP SERPL CALC-SCNC: 14 MMOL/L (ref 10–20)
AST SERPL W P-5'-P-CCNC: 48 U/L (ref 9–39)
BILIRUB SERPL-MCNC: 0.4 MG/DL (ref 0–1.2)
BUN SERPL-MCNC: 28 MG/DL (ref 6–23)
CALCIUM SERPL-MCNC: 9.4 MG/DL (ref 8.6–10.3)
CHLORIDE SERPL-SCNC: 101 MMOL/L (ref 98–107)
CHOLEST SERPL-MCNC: 227 MG/DL (ref 0–199)
CHOLESTEROL/HDL RATIO: 3
CO2 SERPL-SCNC: 25 MMOL/L (ref 21–32)
CORTIS SERPL-MCNC: 26.3 UG/DL (ref 2.5–20)
CREAT SERPL-MCNC: 0.72 MG/DL (ref 0.5–1.05)
EGFRCR SERPLBLD CKD-EPI 2021: >90 ML/MIN/1.73M*2
ERYTHROCYTE [DISTWIDTH] IN BLOOD BY AUTOMATED COUNT: 12.3 % (ref 11.5–14.5)
FERRITIN SERPL-MCNC: 25 NG/ML (ref 8–150)
GLUCOSE SERPL-MCNC: 84 MG/DL (ref 74–99)
HCT VFR BLD AUTO: 38.7 % (ref 36–46)
HDLC SERPL-MCNC: 76.8 MG/DL
HGB BLD-MCNC: 12.5 G/DL (ref 12–16)
IRON SATN MFR SERPL: 23 % (ref 25–45)
IRON SERPL-MCNC: 113 UG/DL (ref 35–150)
LDLC SERPL CALC-MCNC: 139 MG/DL
MCH RBC QN AUTO: 29.3 PG (ref 26–34)
MCHC RBC AUTO-ENTMCNC: 32.3 G/DL (ref 32–36)
MCV RBC AUTO: 91 FL (ref 80–100)
NON HDL CHOLESTEROL: 150 MG/DL (ref 0–149)
NRBC BLD-RTO: 0 /100 WBCS (ref 0–0)
PLATELET # BLD AUTO: 247 X10*3/UL (ref 150–450)
POTASSIUM SERPL-SCNC: 4.2 MMOL/L (ref 3.5–5.3)
PROT SERPL-MCNC: 6.8 G/DL (ref 6.4–8.2)
RBC # BLD AUTO: 4.26 X10*6/UL (ref 4–5.2)
SODIUM SERPL-SCNC: 136 MMOL/L (ref 136–145)
TIBC SERPL-MCNC: 482 UG/DL (ref 240–445)
TRIGL SERPL-MCNC: 57 MG/DL (ref 0–149)
TSH SERPL-ACNC: 3.36 MIU/L (ref 0.44–3.98)
UIBC SERPL-MCNC: 369 UG/DL (ref 110–370)
VIT B12 SERPL-MCNC: 676 PG/ML (ref 211–911)
VLDL: 11 MG/DL (ref 0–40)
WBC # BLD AUTO: 7.5 X10*3/UL (ref 4.4–11.3)

## 2024-07-18 PROCEDURE — 85027 COMPLETE CBC AUTOMATED: CPT

## 2024-07-18 PROCEDURE — 80053 COMPREHEN METABOLIC PANEL: CPT

## 2024-07-18 PROCEDURE — 82728 ASSAY OF FERRITIN: CPT

## 2024-07-18 PROCEDURE — 82533 TOTAL CORTISOL: CPT

## 2024-07-18 PROCEDURE — 82306 VITAMIN D 25 HYDROXY: CPT

## 2024-07-18 PROCEDURE — 83540 ASSAY OF IRON: CPT

## 2024-07-18 PROCEDURE — 36415 COLL VENOUS BLD VENIPUNCTURE: CPT

## 2024-07-18 PROCEDURE — 80061 LIPID PANEL: CPT

## 2024-07-18 PROCEDURE — 84443 ASSAY THYROID STIM HORMONE: CPT

## 2024-07-18 PROCEDURE — 82607 VITAMIN B-12: CPT

## 2024-07-18 PROCEDURE — 84630 ASSAY OF ZINC: CPT

## 2024-07-18 PROCEDURE — 83550 IRON BINDING TEST: CPT

## 2024-07-19 ENCOUNTER — PATIENT MESSAGE (OUTPATIENT)
Dept: PRIMARY CARE | Facility: CLINIC | Age: 34
End: 2024-07-19
Payer: COMMERCIAL

## 2024-07-19 ENCOUNTER — TELEPHONE (OUTPATIENT)
Dept: PRIMARY CARE | Facility: CLINIC | Age: 34
End: 2024-07-19
Payer: COMMERCIAL

## 2024-07-19 DIAGNOSIS — R79.89 ELEVATED CORTISOL LEVEL: Primary | ICD-10-CM

## 2024-07-19 DIAGNOSIS — F41.1 GENERALIZED ANXIETY DISORDER: ICD-10-CM

## 2024-07-19 DIAGNOSIS — R63.2 BINGE EATING: ICD-10-CM

## 2024-07-19 NOTE — TELEPHONE ENCOUNTER
Patient received lab results on MyChart and is concerned with her abnormal labs.   Please advise. Thank you.

## 2024-07-19 NOTE — TELEPHONE ENCOUNTER
Spoke with patient and she is aware of results. She states that she is doing well on the Prozac but she is wanting to know if the vyvanse can either be increased because you had started her on the 10mg when she was 90 + lbs, or if she should stop it all together. She feels like it is not doing anything. Please advise.  PL

## 2024-07-22 LAB — ZINC SERPL-MCNC: 90.2 UG/DL (ref 60–120)

## 2024-07-22 PROCEDURE — 88305 TISSUE EXAM BY PATHOLOGIST: CPT

## 2024-07-22 RX ORDER — LISDEXAMFETAMINE DIMESYLATE 10 MG/1
10 CAPSULE ORAL DAILY
Qty: 30 CAPSULE | Refills: 0 | Status: SHIPPED | OUTPATIENT
Start: 2024-07-22 | End: 2024-08-21

## 2024-07-22 NOTE — PATIENT COMMUNICATION
Spoke with patient and she was coming back on the 23rd so I did schedule her for the 26th.  She wanted to know if she should even be still taking the vyvanse as she said that she feels like she is more tired on it or can she just stop it or do you want her to take something else in the meantime? Please advise. PL

## 2024-07-23 ENCOUNTER — LAB REQUISITION (OUTPATIENT)
Dept: LAB | Facility: HOSPITAL | Age: 34
End: 2024-07-23
Payer: COMMERCIAL

## 2024-07-23 DIAGNOSIS — R87.610 ATYPICAL SQUAMOUS CELLS OF UNDETERMINED SIGNIFICANCE ON CYTOLOGIC SMEAR OF CERVIX (ASC-US): ICD-10-CM

## 2024-07-23 DIAGNOSIS — R87.810 CERVICAL HIGH RISK HUMAN PAPILLOMAVIRUS (HPV) DNA TEST POSITIVE: ICD-10-CM

## 2024-07-24 ENCOUNTER — LAB (OUTPATIENT)
Dept: LAB | Facility: LAB | Age: 34
End: 2024-07-24
Payer: COMMERCIAL

## 2024-07-24 DIAGNOSIS — N91.1 SECONDARY AMENORRHEA: Primary | ICD-10-CM

## 2024-07-24 LAB
DHEA-S SERPL-MCNC: 147 UG/DL (ref 12–379)
ESTRADIOL SERPL-MCNC: <19 PG/ML
FSH SERPL-ACNC: 6.2 IU/L
LH SERPL-ACNC: 2.6 IU/L
PROLACTIN SERPL-MCNC: 73.8 UG/L (ref 3–20)

## 2024-07-24 PROCEDURE — 82670 ASSAY OF TOTAL ESTRADIOL: CPT

## 2024-07-24 PROCEDURE — 83002 ASSAY OF GONADOTROPIN (LH): CPT

## 2024-07-24 PROCEDURE — 83001 ASSAY OF GONADOTROPIN (FSH): CPT

## 2024-07-24 PROCEDURE — 84146 ASSAY OF PROLACTIN: CPT

## 2024-07-24 PROCEDURE — 82627 DEHYDROEPIANDROSTERONE: CPT

## 2024-07-24 PROCEDURE — 36415 COLL VENOUS BLD VENIPUNCTURE: CPT

## 2024-07-24 PROCEDURE — 84402 ASSAY OF FREE TESTOSTERONE: CPT

## 2024-07-24 PROCEDURE — 83516 IMMUNOASSAY NONANTIBODY: CPT

## 2024-07-25 ENCOUNTER — TELEPHONE (OUTPATIENT)
Dept: ENDOCRINOLOGY | Facility: CLINIC | Age: 34
End: 2024-07-25
Payer: COMMERCIAL

## 2024-07-25 LAB
LABORATORY COMMENT REPORT: NORMAL
PATH REPORT.COMMENTS IMP SPEC: NORMAL
PATH REPORT.FINAL DX SPEC: NORMAL
PATH REPORT.GROSS SPEC: NORMAL
PATH REPORT.RELEVANT HX SPEC: NORMAL
PATH REPORT.TOTAL CANCER: NORMAL

## 2024-07-25 NOTE — PROGRESS NOTES
"HPI   32 yo presents as new pt with questions regarding elevated prolactin and irregular periods.  Pt with depression/anxiety/ocd, and anorexia/binge eating disorder, hx of migraines (often associated with menses).    Weight:  5'5 1/2\"  Lowest wt as adult -80lbs in dec 2023, highest wt around 135 lbs    -pt has 2 children, last pregnancy 1/22, minimal nursing  -no period for over 2 years  -negative transvaginal ultrasound 2024 per pt  Labs: July 2024: dheas-147, cortisol-26.3, tsh-3.36, fsh/lh-not elevated, estradiol <19, prolactin-73.8    -pt is not pregnant, -urinary pregnancy test (per pt) and transvaginal ultrasound negative (per pt) this week    Sx:  -no periods for over 2 yrs, elevated prolactin (previously normal in 2022),   -low estradiol with no elevation in fsh/lh  -when pt put pressure on breast had some discharge (pt thinks clear), never happened before  -having hot flashes/night sweats  -some blurry vision, peripheral vision intact    In the past year went on prozac and mood improved, appetite increased.  Had a trial of vyvanse to try to lower appetite.  Pt working on getting into the [facility] program for treatment for her eating disorder.    Past Medical History:   Diagnosis Date    Allergic      Anxiety      Depression      Eczema      Headache      OCD (obsessive compulsive disorder)      Surgical History         Past Surgical History:   Procedure Laterality Date    WISDOM TOOTH EXTRACTION             SH-neg tobacco, neg alcohol           Family History   Problem Relation Name Age of Onset    Panic attack Mother Teresa barbakeeley      Arthritis Mother Teresa mcfadden      Depression Mother Teresa mcfadden      Genetic Testing Mother Teresa mcfadden      Hypertension Mother Teresa mcfadden      Mental illness Mother Teresa mcfadden      Miscarriages / Stillbirths Mother Teresa mcfadden      Diabetes Father Delonte Bogdan      Congenital heart disease Father Delonte Mcfadden      Hernia Father " "Delonte Mcfadden      No Known Problems Sister        No Known Problems Brother        No Known Problems Son        Diabetes Maternal Grandmother Bare Valdawood      Breast cancer Maternal Grandmother Bare Valentic      Cancer Maternal Grandmother Bare [place]dawood      Hypertension Maternal Grandfather        Heart disease Paternal Grandmother Berta      Stroke Paternal Grandmother Berta      Heart disease Paternal Grandfather Marty mcfadden      Blood clot Paternal Grandfather Marty mcfadden      Hearing loss Paternal Grandfather Marty mcfadden         Current Outpatient Medications:     APPLE CIDER VINEGAR ORAL, Take 100 mg by mouth once daily., Disp: , Rfl:     collagen, hydrolysate, bovine, (collagen, hydr, bovine,, bulk,) 100 % powder, 100 % once daily., Disp: , Rfl:     FLUoxetine (PROzac) 40 mg capsule, TAKE 1 CAPSULE BY MOUTH ONCE DAILY, Disp: 90 capsule, Rfl: 0    lisdexamfetamine (Vyvanse) 10 MG capsule, Take 1 capsule (10 mg) by mouth once daily. (Patient not taking: Reported on 7/26/2024), Disp: 30 capsule, Rfl: 0    MULTIVITAMIN ORAL, Take 1 tablet by mouth once daily., Disp: , Rfl:       Allergies as of 07/26/2024 - Reviewed 07/26/2024   Allergen Reaction Noted    Cat dander Shortness of breath and Runny nose 10/27/2023    Loracarbef Hives and Itching 10/26/2023         Review of Systems   Cardiology: Lightheadedness-denies.  Chest pain-in L chest at times.  Leg edema-denies.  Palpitations-denies.  Respiratory: Cough-denies. Shortness of breath-denies.  Wheezing-denies.  Gastroenterology: Constipation-denies, bloating.  Diarrhea-denies.  Heartburn-at times  Endocrinology: Cold intolerance-denies.  Heat intolerance +.  Sweats +  Neurology: Headache +.  Tremor-denies.  Neuropathy in extremities-denies.  Psychology: Low energy-denies.  Irritability-denies.  Sleep disturbances-denies.      /67 (BP Location: Left arm, Patient Position: Sitting, BP Cuff Size: Large adult)   Pulse 75   Ht 1.664 m (5' 5.5\")  " " Wt 61.3 kg (135 lb 3.2 oz)   BMI 22.16 kg/m²       Labs:  Lab Results   Component Value Date    WBC 7.5 07/18/2024    NRBC 0.0 07/18/2024    RBC 4.26 07/18/2024    HGB 12.5 07/18/2024    HCT 38.7 07/18/2024     07/18/2024     Lab Results   Component Value Date    CALCIUM 9.4 07/18/2024    AST 48 (H) 07/18/2024    ALKPHOS 68 07/18/2024    BILITOT 0.4 07/18/2024    PROT 6.8 07/18/2024    ALBUMIN 4.0 07/18/2024    GLOB 1.8 (L) 08/23/2023    AGR 2.3 08/23/2023     07/18/2024    K 4.2 07/18/2024     07/18/2024    CO2 25 07/18/2024    ANIONGAP 14 07/18/2024    BUN 28 (H) 07/18/2024    CREATININE 0.72 07/18/2024    UREACREAUR 35.0 (H) 08/23/2023    GLUCOSE 84 07/18/2024    ALT 52 (H) 07/18/2024    EGFR >90 07/18/2024     Lab Results   Component Value Date    CHOL 227 (H) 07/18/2024    TRIG 57 07/18/2024    HDL 76.8 07/18/2024    LDLCALC 139 (H) 07/18/2024     No results found for: \"MICROALBCREA\"  Lab Results   Component Value Date    TSH 3.36 07/18/2024     Lab Results   Component Value Date    [id number] 676 07/18/2024     Lab Results   Component Value Date    HGBA1C 4.6 08/23/2023         Physical Exam   General Appearance: pleasant, cooperative, no acute distress  HEENT: no chemosis, no proptosis, no lid lag, no lid retraction  Neck: no lymphadenopathy, no thyromegaly, no dominant thyroid nodules  Heart: no murmurs, regular rate and rhythm, S1 and S2  Lungs: no wheezes, no rhonci, no rales  Extremities: no lower extremity swelling      Assessment/Plan   1. Elevated cortisol level  -labs/notes/tests reviewed  -pt's mother assisted with history today    -suspect stress, depending on MRI findings can test further if needed  -no signs/sx of cortisol excess on hx/exam    2. Elevated prolactin level  -normal in 2022, abnormal in July 2024 with sx  -fsh/lh not appropriately elevated but hx of eating disorder  -check MRI of sella and go from there  -no meds that typically elevate prolactin (low potential " with prozac)    3. Amenorrhea  -eating disorder, elevated prolactin  -check mri of sella      Follow Up:  Obed 3 months    -labs/tests/notes reviewed  -reviewed and counseled patient on medication monitoring and side effects

## 2024-07-25 NOTE — TELEPHONE ENCOUNTER
Luanne Mahan   1990   [id number]   [phone]       Patient called to reschedule appt for a soon date due to test results. Patient appt is scheduled for 7/26/24 at 2pm.

## 2024-07-26 ENCOUNTER — OFFICE VISIT (OUTPATIENT)
Dept: ENDOCRINOLOGY | Facility: CLINIC | Age: 34
End: 2024-07-26
Payer: COMMERCIAL

## 2024-07-26 VITALS
WEIGHT: 135.2 LBS | HEART RATE: 75 BPM | HEIGHT: 66 IN | DIASTOLIC BLOOD PRESSURE: 67 MMHG | BODY MASS INDEX: 21.73 KG/M2 | SYSTOLIC BLOOD PRESSURE: 118 MMHG

## 2024-07-26 DIAGNOSIS — N91.2 AMENORRHEA: ICD-10-CM

## 2024-07-26 DIAGNOSIS — R79.89 ELEVATED CORTISOL LEVEL: ICD-10-CM

## 2024-07-26 DIAGNOSIS — R79.89 ELEVATED PROLACTIN LEVEL: Primary | ICD-10-CM

## 2024-07-26 LAB — MIS SERPL-MCNC: 1.82 NG/ML (ref 0.18–11.71)

## 2024-07-26 PROCEDURE — 3008F BODY MASS INDEX DOCD: CPT | Performed by: INTERNAL MEDICINE

## 2024-07-26 PROCEDURE — 1036F TOBACCO NON-USER: CPT | Performed by: INTERNAL MEDICINE

## 2024-07-26 PROCEDURE — 99204 OFFICE O/P NEW MOD 45 MIN: CPT | Performed by: INTERNAL MEDICINE

## 2024-07-26 ASSESSMENT — PATIENT HEALTH QUESTIONNAIRE - PHQ9
SUM OF ALL RESPONSES TO PHQ9 QUESTIONS 1 AND 2: 2
10. IF YOU CHECKED OFF ANY PROBLEMS, HOW DIFFICULT HAVE THESE PROBLEMS MADE IT FOR YOU TO DO YOUR WORK, TAKE CARE OF THINGS AT HOME, OR GET ALONG WITH OTHER PEOPLE: SOMEWHAT DIFFICULT
1. LITTLE INTEREST OR PLEASURE IN DOING THINGS: SEVERAL DAYS
2. FEELING DOWN, DEPRESSED OR HOPELESS: SEVERAL DAYS

## 2024-07-26 ASSESSMENT — PAIN SCALES - GENERAL: PAINLEVEL: 0-NO PAIN

## 2024-07-26 ASSESSMENT — ENCOUNTER SYMPTOMS: DEPRESSION: 1

## 2024-07-27 LAB
TESTOSTERONE FREE (CHAN): 2.2 PG/ML (ref 0.1–6.4)
TESTOSTERONE,TOTAL,LC-MS/MS: 14 NG/DL (ref 2–45)

## 2024-07-29 ENCOUNTER — HOSPITAL ENCOUNTER (OUTPATIENT)
Dept: RADIOLOGY | Facility: HOSPITAL | Age: 34
Discharge: HOME | End: 2024-07-29
Payer: COMMERCIAL

## 2024-07-29 DIAGNOSIS — R79.89 ELEVATED PROLACTIN LEVEL: ICD-10-CM

## 2024-07-29 PROCEDURE — 70553 MRI BRAIN STEM W/O & W/DYE: CPT

## 2024-07-29 PROCEDURE — A9575 INJ GADOTERATE MEGLUMI 0.1ML: HCPCS | Performed by: INTERNAL MEDICINE

## 2024-07-29 PROCEDURE — 70553 MRI BRAIN STEM W/O & W/DYE: CPT | Performed by: RADIOLOGY

## 2024-07-29 PROCEDURE — 2550000001 HC RX 255 CONTRASTS: Performed by: INTERNAL MEDICINE

## 2024-07-29 RX ORDER — GADOTERATE MEGLUMINE 376.9 MG/ML
13 INJECTION INTRAVENOUS
Status: COMPLETED | OUTPATIENT
Start: 2024-07-29 | End: 2024-07-29

## 2024-08-02 ENCOUNTER — TELEPHONE (OUTPATIENT)
Dept: ENDOCRINOLOGY | Facility: CLINIC | Age: 34
End: 2024-08-02
Payer: COMMERCIAL

## 2024-08-05 ENCOUNTER — TELEPHONE (OUTPATIENT)
Dept: PRIMARY CARE | Facility: CLINIC | Age: 34
End: 2024-08-05
Payer: COMMERCIAL

## 2024-08-05 DIAGNOSIS — F41.1 GENERALIZED ANXIETY DISORDER: Primary | ICD-10-CM

## 2024-08-05 DIAGNOSIS — R79.89 ELEVATED PROLACTIN LEVEL: Primary | ICD-10-CM

## 2024-08-05 NOTE — TELEPHONE ENCOUNTER
Patient is aware Dr. ISAIAS Armenta is out of the office today.   Patient is calling stating she was seen by the OB and the Endocrinologist and had a MRI of the Brain which was normal having no Pituitary tumor. She was informed that the Prozac can lead to symptoms that she has been having. She is asking if she can be taken off the Prozac and put back on Effexor.  This is the medication and Mg patient is taking now    FLUoxetine (PROzac) 40 mg capsule TAKE 1 CAPSULE BY MOUTH ONCE DAILY      PT # [phone]

## 2024-08-06 RX ORDER — VENLAFAXINE HYDROCHLORIDE 75 MG/1
75 CAPSULE, EXTENDED RELEASE ORAL DAILY
Qty: 30 CAPSULE | Refills: 1 | Status: SHIPPED | OUTPATIENT
Start: 2024-08-06 | End: 2024-08-19 | Stop reason: SDUPTHER

## 2024-08-09 DIAGNOSIS — F41.1 GENERALIZED ANXIETY DISORDER: ICD-10-CM

## 2024-08-13 RX ORDER — FLUOXETINE HYDROCHLORIDE 40 MG/1
40 CAPSULE ORAL DAILY
Qty: 90 CAPSULE | Refills: 0 | OUTPATIENT
Start: 2024-08-13

## 2024-08-19 RX ORDER — VENLAFAXINE HYDROCHLORIDE 75 MG/1
75 CAPSULE, EXTENDED RELEASE ORAL DAILY
Qty: 30 CAPSULE | Refills: 1 | Status: SHIPPED | OUTPATIENT
Start: 2024-08-19 | End: 2024-10-18

## 2024-08-19 NOTE — TELEPHONE ENCOUNTER
Patient is requesting an extension on when to  her medication.  Patient is in [place] until Saturday, Aug 24th. System is stating that it needs to be picked up by the 20th.  Please advise. Thank you!

## 2024-08-26 ENCOUNTER — APPOINTMENT (OUTPATIENT)
Dept: PRIMARY CARE | Facility: CLINIC | Age: 34
End: 2024-08-26
Payer: COMMERCIAL

## 2024-09-04 ENCOUNTER — TELEPHONE (OUTPATIENT)
Dept: ENDOCRINOLOGY | Facility: CLINIC | Age: 34
End: 2024-09-04
Payer: COMMERCIAL

## 2024-09-04 NOTE — TELEPHONE ENCOUNTER
Luanne Mahan   1990   [id number]   [phone]       Patient called and wanted to talk to provider in regards to low estrogen and wanting hormone replacement. However, scheduled patient an appt to see provider in regards to this.

## 2024-09-05 ENCOUNTER — LAB (OUTPATIENT)
Dept: LAB | Facility: LAB | Age: 34
End: 2024-09-05
Payer: COMMERCIAL

## 2024-09-05 ENCOUNTER — OFFICE VISIT (OUTPATIENT)
Dept: ENDOCRINOLOGY | Facility: CLINIC | Age: 34
End: 2024-09-05
Payer: COMMERCIAL

## 2024-09-05 VITALS
WEIGHT: 154.8 LBS | HEART RATE: 70 BPM | BODY MASS INDEX: 24.88 KG/M2 | SYSTOLIC BLOOD PRESSURE: 109 MMHG | DIASTOLIC BLOOD PRESSURE: 67 MMHG | HEIGHT: 66 IN

## 2024-09-05 DIAGNOSIS — R79.89 ELEVATED PROLACTIN LEVEL: Primary | ICD-10-CM

## 2024-09-05 DIAGNOSIS — R79.89 ELEVATED PROLACTIN LEVEL: ICD-10-CM

## 2024-09-05 DIAGNOSIS — N91.2 AMENORRHEA: ICD-10-CM

## 2024-09-05 LAB
ESTRADIOL SERPL-MCNC: 26 PG/ML
FSH SERPL-ACNC: 6.5 IU/L
LH SERPL-ACNC: 1.7 IU/L
PROLACTIN SERPL-MCNC: 10.3 UG/L (ref 3–20)

## 2024-09-05 PROCEDURE — 82670 ASSAY OF TOTAL ESTRADIOL: CPT

## 2024-09-05 PROCEDURE — 36415 COLL VENOUS BLD VENIPUNCTURE: CPT

## 2024-09-05 PROCEDURE — 83002 ASSAY OF GONADOTROPIN (LH): CPT

## 2024-09-05 PROCEDURE — 99213 OFFICE O/P EST LOW 20 MIN: CPT | Performed by: INTERNAL MEDICINE

## 2024-09-05 PROCEDURE — 1036F TOBACCO NON-USER: CPT | Performed by: INTERNAL MEDICINE

## 2024-09-05 PROCEDURE — 84146 ASSAY OF PROLACTIN: CPT

## 2024-09-05 PROCEDURE — 3008F BODY MASS INDEX DOCD: CPT | Performed by: INTERNAL MEDICINE

## 2024-09-05 PROCEDURE — 83001 ASSAY OF GONADOTROPIN (FSH): CPT

## 2024-09-05 ASSESSMENT — PATIENT HEALTH QUESTIONNAIRE - PHQ9
2. FEELING DOWN, DEPRESSED OR HOPELESS: NOT AT ALL
1. LITTLE INTEREST OR PLEASURE IN DOING THINGS: NOT AT ALL
SUM OF ALL RESPONSES TO PHQ9 QUESTIONS 1 & 2: 0

## 2024-09-05 ASSESSMENT — LIFESTYLE VARIABLES
AUDIT-C TOTAL SCORE: 0
HOW OFTEN DO YOU HAVE SIX OR MORE DRINKS ON ONE OCCASION: NEVER
HOW OFTEN DO YOU HAVE A DRINK CONTAINING ALCOHOL: NEVER
SKIP TO QUESTIONS 9-10: 1
HOW MANY STANDARD DRINKS CONTAINING ALCOHOL DO YOU HAVE ON A TYPICAL DAY: PATIENT DOES NOT DRINK

## 2024-09-05 ASSESSMENT — PAIN SCALES - GENERAL: PAINLEVEL: 0-NO PAIN

## 2024-09-05 NOTE — PROGRESS NOTES
"HPI   34 yo presents in follow up with questions regarding elevated prolactin and irregular periods.  Pt with depression/anxiety/ocd, and anorexia/binge eating disorder, hx of migraines (often associated with menses).     Weight:  5'5 1/2\"  Lowest wt as adult -80lbs in dec 2023, highest wt today 154 lbs sept 2024     -pt has 2 children, last pregnancy 1/22, minimal nursing  -no period for over 2 years  -negative transvaginal ultrasound 2024 per pt  Labs: July 2024: dheas-147, cortisol-26.3, tsh-3.36, fsh/lh-not elevated, estradiol <19, prolactin-73.8     -pt is not pregnant, -urinary pregnancy test (per pt) and transvaginal ultrasound negative (per pt) this week     Sx:  -no periods for over 2 yrs, elevated prolactin (previously normal in 2022),   -low estradiol with no elevation in fsh/lh  -when pt put pressure on breast had some discharge, never happened before  -having hot flashes/night sweats  -came off prozac July 2024 (feeling more irritable off therapy)     Pt working on getting into the [facility] program for treatment for her eating disorder.    -pt has not repeat labs      Current Outpatient Medications:     APPLE CIDER VINEGAR ORAL, Take 100 mg by mouth once daily., Disp: , Rfl:     collagen, hydrolysate, bovine, (collagen, hydr, bovine,, bulk,) 100 % powder, 100 % once daily., Disp: , Rfl:     MULTIVITAMIN ORAL, Take 1 tablet by mouth once daily., Disp: , Rfl:     venlafaxine XR (Effexor-XR) 75 mg 24 hr capsule, Take 1 capsule (75 mg) by mouth once daily. Take with food., Disp: 30 capsule, Rfl: 1    lisdexamfetamine (Vyvanse) 10 MG capsule, Take 1 capsule (10 mg) by mouth once daily. (Patient not taking: Reported on 7/26/2024), Disp: 30 capsule, Rfl: 0      Allergies as of 09/05/2024 - Reviewed 09/05/2024   Allergen Reaction Noted    Cat dander Shortness of breath and Runny nose 10/27/2023    Loracarbef Hives and Itching 10/26/2023         Review of Systems   Cardiology: Lightheadedness-denies.  Chest " "pain-denies.  Leg edema-denies.  Palpitations-denies.  Respiratory: Cough-denies. Shortness of breath-denies.  Wheezing-denies.  Gastroenterology: Constipation-denies.  Diarrhea-denies.  Heartburn-denies.  Endocrinology: Cold intolerance-denies.  Heat intolerance +.  Sweats +  Neurology: Headache-denies.  Tremor-denies.  Neuropathy in extremities-denies.  Psychology: Low energy-denies.  Irritability +.  Sleep disturbances-denies.      /67   Pulse 70   Ht 1.664 m (5' 5.5\")   Wt 70.2 kg (154 lb 12.8 oz)   BMI 25.37 kg/m²       Labs:  Lab Results   Component Value Date    WBC 7.5 07/18/2024    NRBC 0.0 07/18/2024    RBC 4.26 07/18/2024    HGB 12.5 07/18/2024    HCT 38.7 07/18/2024     07/18/2024     Lab Results   Component Value Date    CALCIUM 9.4 07/18/2024    AST 48 (H) 07/18/2024    ALKPHOS 68 07/18/2024    BILITOT 0.4 07/18/2024    PROT 6.8 07/18/2024    ALBUMIN 4.0 07/18/2024    GLOB 1.8 (L) 08/23/2023    AGR 2.3 08/23/2023     07/18/2024    K 4.2 07/18/2024     07/18/2024    CO2 25 07/18/2024    ANIONGAP 14 07/18/2024    BUN 28 (H) 07/18/2024    CREATININE 0.72 07/18/2024    UREACREAUR 35.0 (H) 08/23/2023    GLUCOSE 84 07/18/2024    ALT 52 (H) 07/18/2024    EGFR >90 07/18/2024     Lab Results   Component Value Date    CHOL 227 (H) 07/18/2024    TRIG 57 07/18/2024    HDL 76.8 07/18/2024    LDLCALC 139 (H) 07/18/2024     No results found for: \"MICROALBCREA\"  Lab Results   Component Value Date    TSH 3.36 07/18/2024     Lab Results   Component Value Date    [id number] 676 07/18/2024     Lab Results   Component Value Date    HGBA1C 4.6 08/23/2023         Physical Exam   General Appearance: pleasant, cooperative, no acute distress  HEENT: no chemosis, no proptosis, no lid lag, no lid retraction  Neck: no lymphadenopathy, no thyromegaly, no dominant thyroid nodules  Heart: no murmurs, regular rate and rhythm, S1 and S2  Lungs: no wheezes, no rhonci, no rales  Extremities: no lower " extremity swelling      Assessment/Plan   1. Elevated prolactin level  -negative MRI of sella (scan reviewed with pt today) , still with no periods/breast discharge/hot flashes  -repeat fsh/lh/estradiol/prolactin  -if prolactin still elevated give trial of cabergoline to see if any sx improve  -went over risk/benefits/warnings    -if they don't then using OCP would be next therapy ( in past pt took for years/was concerned about wt gain in the past)    2. Amenorrhea  -as above         Follow Up:  Obed 3 months    -labs/tests/notes reviewed  -reviewed and counseled patient on medication monitoring and side effects

## 2024-10-04 ENCOUNTER — TELEPHONE (OUTPATIENT)
Dept: ENDOCRINOLOGY | Facility: CLINIC | Age: 34
End: 2024-10-04

## 2024-10-04 ENCOUNTER — APPOINTMENT (OUTPATIENT)
Dept: ENDOCRINOLOGY | Facility: CLINIC | Age: 34
End: 2024-10-04
Payer: COMMERCIAL

## 2024-10-23 ENCOUNTER — TELEPHONE (OUTPATIENT)
Dept: PRIMARY CARE | Facility: CLINIC | Age: 34
End: 2024-10-23
Payer: COMMERCIAL

## 2024-10-31 ENCOUNTER — APPOINTMENT (OUTPATIENT)
Dept: OBSTETRICS AND GYNECOLOGY | Facility: CLINIC | Age: 34
End: 2024-10-31
Payer: COMMERCIAL

## 2024-11-25 ENCOUNTER — APPOINTMENT (OUTPATIENT)
Dept: OBSTETRICS AND GYNECOLOGY | Facility: CLINIC | Age: 34
End: 2024-11-25
Payer: COMMERCIAL

## 2025-01-14 ENCOUNTER — TELEPHONE (OUTPATIENT)
Dept: ENDOCRINOLOGY | Facility: CLINIC | Age: 35
End: 2025-01-14
Payer: MEDICARE

## 2025-01-14 DIAGNOSIS — N91.2 AMENORRHEA: ICD-10-CM

## 2025-01-14 DIAGNOSIS — R79.89 ELEVATED PROLACTIN LEVEL: Primary | ICD-10-CM

## 2025-01-14 NOTE — TELEPHONE ENCOUNTER
Seen forHormonal imbalance 9/2024 and has follow up appt 1/27/25 -pt requesting labs for this visit??

## 2025-01-16 NOTE — PROGRESS NOTES
HPI             Current Outpatient Medications:     APPLE CIDER VINEGAR ORAL, Take 100 mg by mouth once daily., Disp: , Rfl:     collagen, hydrolysate, bovine, (collagen, hydr, bovine,, bulk,) 100 % powder, 100 % once daily., Disp: , Rfl:     lisdexamfetamine (Vyvanse) 10 MG capsule, Take 1 capsule (10 mg) by mouth once daily. (Patient not taking: Reported on 7/26/2024), Disp: 30 capsule, Rfl: 0    MULTIVITAMIN ORAL, Take 1 tablet by mouth once daily., Disp: , Rfl:     venlafaxine XR (Effexor-XR) 75 mg 24 hr capsule, Take 1 capsule (75 mg) by mouth once daily. Take with food., Disp: 30 capsule, Rfl: 1      Allergies as of 01/20/2025 - Reviewed 09/05/2024   Allergen Reaction Noted    Cat dander Shortness of breath and Runny nose 10/27/2023    Loracarbef Hives and Itching 10/26/2023         Review of Systems   Cardiology: Lightheadedness-denies.  Chest pain-denies.  Leg edema-denies.  Palpitations-denies.  Respiratory: Cough-denies. Shortness of breath-denies.  Wheezing-denies.  Gastroenterology: Constipation-denies.  Diarrhea-denies.  Heartburn-denies.  Endocrinology: Cold intolerance-denies.  Heat intolerance-denies.  Sweats-denies.  Neurology: Headache-denies.  Tremor-denies.  Neuropathy in extremities-denies.  Psychology: Low energy-denies.  Irritability-denies.  Sleep disturbances-denies.      There were no vitals taken for this visit.      Labs:  Lab Results   Component Value Date    WBC 7.5 07/18/2024    NRBC 0.0 07/18/2024    RBC 4.26 07/18/2024    HGB 12.5 07/18/2024    HCT 38.7 07/18/2024     07/18/2024     Lab Results   Component Value Date    CALCIUM 9.4 07/18/2024    AST 48 (H) 07/18/2024    ALKPHOS 68 07/18/2024    BILITOT 0.4 07/18/2024    PROT 6.8 07/18/2024    ALBUMIN 4.0 07/18/2024    GLOB 1.8 (L) 08/23/2023    AGR 2.3 08/23/2023     07/18/2024    K 4.2 07/18/2024     07/18/2024    CO2 25 07/18/2024    ANIONGAP 14 07/18/2024    BUN 28 (H) 07/18/2024    CREATININE 0.72 07/18/2024     "UREACREAUR 35.0 (H) 08/23/2023    GLUCOSE 84 07/18/2024    ALT 52 (H) 07/18/2024    EGFR >90 07/18/2024     Lab Results   Component Value Date    CHOL 227 (H) 07/18/2024    TRIG 57 07/18/2024    HDL 76.8 07/18/2024    LDLCALC 139 (H) 07/18/2024     No results found for: \"MICROALBCREA\"  Lab Results   Component Value Date    TSH 3.36 07/18/2024     Lab Results   Component Value Date    [id number] 07/18/2024     Lab Results   Component Value Date    HGBA1C 4.6 08/23/2023         Physical Exam   General Appearance: pleasant, cooperative, no acute distress  HEENT: no chemosis, no proptosis, no lid lag, no lid retraction  Neck: no lymphadenopathy, no thyromegaly, no dominant thyroid nodules  Heart: no murmurs, regular rate and rhythm, S1 and S2  Lungs: no wheezes, no rhonci, no rales  Extremities: no lower extremity swelling      Assessment/Plan   1. Elevated prolactin level (Primary)  ***    2. Amenorrhea  ***    3. Elevated cortisol level  ***         Follow Up:      -labs/tests/notes reviewed  -reviewed and counseled patient on medication monitoring and side effects          " thyromegaly, no dominant thyroid nodules  Heart: no murmurs, regular rate and rhythm, S1 and S2  Lungs: no wheezes, no rhonci, no rales  Extremities: no lower extremity swelling      Assessment/Plan   1. Elevated prolactin level (Primary)  -levels stable, can follow prn    2. 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 Weight  -pt inquired about glp-1RA therapy for wt loss  -given bmi-25 and no wt related comorbid conditions would advise against it at this point  -furthermore with an underlying eating disorder would be very cautious with this class of medicine        Follow Up:  prn    -labs/tests/notes reviewed  -reviewed and counseled patient on medication monitoring and side effects

## 2025-01-18 ENCOUNTER — LAB (OUTPATIENT)
Dept: LAB | Facility: LAB | Age: 35
End: 2025-01-18
Payer: MEDICARE

## 2025-01-18 DIAGNOSIS — N91.2 AMENORRHEA: ICD-10-CM

## 2025-01-18 DIAGNOSIS — R79.89 ELEVATED PROLACTIN LEVEL: ICD-10-CM

## 2025-01-18 LAB
ESTRADIOL SERPL-MCNC: 22 PG/ML
FSH SERPL-ACNC: 7 IU/L
LH SERPL-ACNC: 2.3 IU/L
PROLACTIN SERPL-MCNC: 5.6 UG/L (ref 3–20)

## 2025-01-18 PROCEDURE — 84146 ASSAY OF PROLACTIN: CPT

## 2025-01-18 PROCEDURE — 82670 ASSAY OF TOTAL ESTRADIOL: CPT

## 2025-01-18 PROCEDURE — 83001 ASSAY OF GONADOTROPIN (FSH): CPT

## 2025-01-18 PROCEDURE — 83002 ASSAY OF GONADOTROPIN (LH): CPT

## 2025-01-20 ENCOUNTER — APPOINTMENT (OUTPATIENT)
Dept: ENDOCRINOLOGY | Facility: CLINIC | Age: 35
End: 2025-01-20
Payer: MEDICARE

## 2025-01-20 VITALS
HEART RATE: 60 BPM | WEIGHT: 154.6 LBS | SYSTOLIC BLOOD PRESSURE: 115 MMHG | DIASTOLIC BLOOD PRESSURE: 72 MMHG | BODY MASS INDEX: 24.85 KG/M2 | HEIGHT: 66 IN

## 2025-01-20 DIAGNOSIS — R79.89 ELEVATED PROLACTIN LEVEL: Primary | ICD-10-CM

## 2025-01-20 DIAGNOSIS — R79.89 ELEVATED CORTISOL LEVEL: ICD-10-CM

## 2025-01-20 DIAGNOSIS — N91.2 AMENORRHEA: ICD-10-CM

## 2025-01-20 PROCEDURE — 99213 OFFICE O/P EST LOW 20 MIN: CPT | Performed by: INTERNAL MEDICINE

## 2025-01-20 PROCEDURE — 3008F BODY MASS INDEX DOCD: CPT | Performed by: INTERNAL MEDICINE

## 2025-01-20 PROCEDURE — 1036F TOBACCO NON-USER: CPT | Performed by: INTERNAL MEDICINE

## 2025-01-20 RX ORDER — GLUCOSAM/CHONDRO/HERB 149/HYAL 750-100 MG
TABLET ORAL
COMMUNITY

## 2025-01-20 RX ORDER — BACLOFEN 20 MG
TABLET ORAL
COMMUNITY

## 2025-01-20 ASSESSMENT — PAIN SCALES - GENERAL: PAINLEVEL_OUTOF10: 0-NO PAIN

## 2025-01-20 ASSESSMENT — ENCOUNTER SYMPTOMS
OCCASIONAL FEELINGS OF UNSTEADINESS: 0
LOSS OF SENSATION IN FEET: 0
DEPRESSION: 0

## 2025-01-20 ASSESSMENT — PATIENT HEALTH QUESTIONNAIRE - PHQ9
2. FEELING DOWN, DEPRESSED OR HOPELESS: NOT AT ALL
SUM OF ALL RESPONSES TO PHQ9 QUESTIONS 1 & 2: 0
1. LITTLE INTEREST OR PLEASURE IN DOING THINGS: NOT AT ALL

## 2025-01-27 ENCOUNTER — APPOINTMENT (OUTPATIENT)
Dept: ENDOCRINOLOGY | Facility: CLINIC | Age: 35
End: 2025-01-27
Payer: MEDICARE

## 2025-03-07 ENCOUNTER — APPOINTMENT (OUTPATIENT)
Dept: PRIMARY CARE | Facility: CLINIC | Age: 35
End: 2025-03-07
Payer: MEDICARE

## 2025-04-15 ENCOUNTER — APPOINTMENT (OUTPATIENT)
Dept: PRIMARY CARE | Facility: CLINIC | Age: 35
End: 2025-04-15
Payer: MEDICARE

## 2025-07-07 PROCEDURE — 87626 HPV SEP HI-RSK TYP&POOL RSLT: CPT

## 2025-07-07 PROCEDURE — 88175 CYTOPATH C/V AUTO FLUID REDO: CPT

## 2025-07-08 ENCOUNTER — LAB REQUISITION (OUTPATIENT)
Dept: LAB | Facility: HOSPITAL | Age: 35
End: 2025-07-08
Payer: MEDICARE

## 2025-07-08 DIAGNOSIS — Z11.51 ENCOUNTER FOR SCREENING FOR HUMAN PAPILLOMAVIRUS (HPV): ICD-10-CM

## 2025-07-08 DIAGNOSIS — R87.810 CERVICAL HIGH RISK HUMAN PAPILLOMAVIRUS (HPV) DNA TEST POSITIVE: ICD-10-CM

## 2025-07-08 DIAGNOSIS — R87.610 ATYPICAL SQUAMOUS CELLS OF UNDETERMINED SIGNIFICANCE ON CYTOLOGIC SMEAR OF CERVIX (ASC-US): ICD-10-CM

## 2025-08-08 ASSESSMENT — PROMIS GLOBAL HEALTH SCALE
CARRYOUT_SOCIAL_ACTIVITIES: GOOD
RATE_PHYSICAL_HEALTH: GOOD
RATE_MENTAL_HEALTH: GOOD
RATE_AVERAGE_FATIGUE: MODERATE
EMOTIONAL_PROBLEMS: OFTEN
RATE_GENERAL_HEALTH: GOOD
RATE_SOCIAL_SATISFACTION: GOOD
RATE_AVERAGE_PAIN: 0
RATE_QUALITY_OF_LIFE: GOOD
CARRYOUT_PHYSICAL_ACTIVITIES: COMPLETELY

## 2025-08-13 ENCOUNTER — APPOINTMENT (OUTPATIENT)
Dept: PRIMARY CARE | Facility: CLINIC | Age: 35
End: 2025-08-13
Payer: MEDICARE

## 2025-08-13 VITALS
BODY MASS INDEX: 25.73 KG/M2 | HEART RATE: 74 BPM | HEIGHT: 65 IN | SYSTOLIC BLOOD PRESSURE: 120 MMHG | DIASTOLIC BLOOD PRESSURE: 60 MMHG | OXYGEN SATURATION: 98 %

## 2025-08-13 DIAGNOSIS — F50.811 MODERATE BINGE-EATING DISORDER: ICD-10-CM

## 2025-08-13 DIAGNOSIS — Z00.00 WELL ADULT EXAM: Primary | ICD-10-CM

## 2025-08-13 PROCEDURE — 99395 PREV VISIT EST AGE 18-39: CPT

## 2025-08-13 PROCEDURE — 99213 OFFICE O/P EST LOW 20 MIN: CPT

## 2025-08-13 RX ORDER — ESCITALOPRAM OXALATE 5 MG/1
5 TABLET ORAL DAILY
Qty: 30 TABLET | Refills: 1 | Status: SHIPPED | OUTPATIENT
Start: 2025-08-13 | End: 2025-10-12

## 2025-08-13 ASSESSMENT — ANXIETY QUESTIONNAIRES
5. BEING SO RESTLESS THAT IT IS HARD TO SIT STILL: NEARLY EVERY DAY
2. NOT BEING ABLE TO STOP OR CONTROL WORRYING: NEARLY EVERY DAY
4. TROUBLE RELAXING: NEARLY EVERY DAY
GAD7 TOTAL SCORE: 19
3. WORRYING TOO MUCH ABOUT DIFFERENT THINGS: NEARLY EVERY DAY
1. FEELING NERVOUS, ANXIOUS, OR ON EDGE: NEARLY EVERY DAY
IF YOU CHECKED OFF ANY PROBLEMS ON THIS QUESTIONNAIRE, HOW DIFFICULT HAVE THESE PROBLEMS MADE IT FOR YOU TO DO YOUR WORK, TAKE CARE OF THINGS AT HOME, OR GET ALONG WITH OTHER PEOPLE: SOMEWHAT DIFFICULT
7. FEELING AFRAID AS IF SOMETHING AWFUL MIGHT HAPPEN: SEVERAL DAYS
6. BECOMING EASILY ANNOYED OR IRRITABLE: NEARLY EVERY DAY

## 2025-08-13 ASSESSMENT — PATIENT HEALTH QUESTIONNAIRE - PHQ9
SUM OF ALL RESPONSES TO PHQ9 QUESTIONS 1 AND 2: 4
SUM OF ALL RESPONSES TO PHQ QUESTIONS 1-9: 18
2. FEELING DOWN, DEPRESSED OR HOPELESS: NEARLY EVERY DAY
1. LITTLE INTEREST OR PLEASURE IN DOING THINGS: SEVERAL DAYS
9. THOUGHTS THAT YOU WOULD BE BETTER OFF DEAD, OR OF HURTING YOURSELF: NOT AT ALL
3. TROUBLE FALLING OR STAYING ASLEEP OR SLEEPING TOO MUCH: NEARLY EVERY DAY
5. POOR APPETITE OR OVEREATING: NEARLY EVERY DAY
7. TROUBLE CONCENTRATING ON THINGS, SUCH AS READING THE NEWSPAPER OR WATCHING TELEVISION: SEVERAL DAYS
4. FEELING TIRED OR HAVING LITTLE ENERGY: NEARLY EVERY DAY
8. MOVING OR SPEAKING SO SLOWLY THAT OTHER PEOPLE COULD HAVE NOTICED. OR THE OPPOSITE, BEING SO FIGETY OR RESTLESS THAT YOU HAVE BEEN MOVING AROUND A LOT MORE THAN USUAL: SEVERAL DAYS
6. FEELING BAD ABOUT YOURSELF - OR THAT YOU ARE A FAILURE OR HAVE LET YOURSELF OR YOUR FAMILY DOWN: NEARLY EVERY DAY

## 2025-08-13 ASSESSMENT — PAIN SCALES - GENERAL: PAINLEVEL_OUTOF10: 0-NO PAIN

## 2025-08-14 LAB
ALBUMIN SERPL-MCNC: 4.5 G/DL (ref 3.6–5.1)
ALP SERPL-CCNC: 60 U/L (ref 31–125)
ALT SERPL-CCNC: 26 U/L (ref 6–29)
ANION GAP SERPL CALCULATED.4IONS-SCNC: 9 MMOL/L (CALC) (ref 7–17)
AST SERPL-CCNC: 25 U/L (ref 10–30)
BASOPHILS # BLD AUTO: 29 CELLS/UL (ref 0–200)
BASOPHILS NFR BLD AUTO: 0.5 %
BILIRUB SERPL-MCNC: 0.3 MG/DL (ref 0.2–1.2)
BUN SERPL-MCNC: 26 MG/DL (ref 7–25)
CALCIUM SERPL-MCNC: 9.3 MG/DL (ref 8.6–10.2)
CHLORIDE SERPL-SCNC: 107 MMOL/L (ref 98–110)
CHOLEST SERPL-MCNC: 175 MG/DL
CHOLEST/HDLC SERPL: 3.7 (CALC)
CO2 SERPL-SCNC: 25 MMOL/L (ref 20–32)
CREAT SERPL-MCNC: 1.05 MG/DL (ref 0.5–0.97)
EGFRCR SERPLBLD CKD-EPI 2021: 72 ML/MIN/1.73M2
EOSINOPHIL # BLD AUTO: 177 CELLS/UL (ref 15–500)
EOSINOPHIL NFR BLD AUTO: 3.1 %
ERYTHROCYTE [DISTWIDTH] IN BLOOD BY AUTOMATED COUNT: 11.5 % (ref 11–15)
GLUCOSE SERPL-MCNC: 89 MG/DL (ref 65–99)
HCT VFR BLD AUTO: 39.7 % (ref 35–45)
HDLC SERPL-MCNC: 47 MG/DL
HGB BLD-MCNC: 13.1 G/DL (ref 11.7–15.5)
LDLC SERPL CALC-MCNC: 114 MG/DL (CALC)
LIPASE SERPL-CCNC: 30 U/L (ref 7–60)
LYMPHOCYTES # BLD AUTO: 1659 CELLS/UL (ref 850–3900)
LYMPHOCYTES NFR BLD AUTO: 29.1 %
MCH RBC QN AUTO: 31.5 PG (ref 27–33)
MCHC RBC AUTO-ENTMCNC: 33 G/DL (ref 32–36)
MCV RBC AUTO: 95.4 FL (ref 80–100)
MONOCYTES # BLD AUTO: 336 CELLS/UL (ref 200–950)
MONOCYTES NFR BLD AUTO: 5.9 %
NEUTROPHILS # BLD AUTO: 3500 CELLS/UL (ref 1500–7800)
NEUTROPHILS NFR BLD AUTO: 61.4 %
NONHDLC SERPL-MCNC: 128 MG/DL (CALC)
PLATELET # BLD AUTO: 246 THOUSAND/UL (ref 140–400)
PMV BLD REES-ECKER: 10.5 FL (ref 7.5–12.5)
POTASSIUM SERPL-SCNC: 4.6 MMOL/L (ref 3.5–5.3)
PROT SERPL-MCNC: 6.7 G/DL (ref 6.1–8.1)
RBC # BLD AUTO: 4.16 MILLION/UL (ref 3.8–5.1)
SODIUM SERPL-SCNC: 141 MMOL/L (ref 135–146)
TRIGL SERPL-MCNC: 56 MG/DL
WBC # BLD AUTO: 5.7 THOUSAND/UL (ref 3.8–10.8)

## 2025-08-28 ASSESSMENT — ENCOUNTER SYMPTOMS
FEVER: 0
NAUSEA: 0
SHORTNESS OF BREATH: 0
PALPITATIONS: 0
CONSTIPATION: 0
UNEXPECTED WEIGHT CHANGE: 0
CHILLS: 0
ABDOMINAL PAIN: 0
VOMITING: 0
FREQUENCY: 0
DIARRHEA: 0

## 2025-09-03 ENCOUNTER — OFFICE VISIT (OUTPATIENT)
Dept: PRIMARY CARE | Facility: CLINIC | Age: 35
End: 2025-09-03
Payer: MEDICARE

## 2025-09-03 VITALS
SYSTOLIC BLOOD PRESSURE: 103 MMHG | OXYGEN SATURATION: 99 % | DIASTOLIC BLOOD PRESSURE: 63 MMHG | HEART RATE: 64 BPM | TEMPERATURE: 97.7 F

## 2025-09-03 DIAGNOSIS — R63.2 BINGE EATING: Primary | ICD-10-CM

## 2025-09-03 DIAGNOSIS — F41.1 GENERALIZED ANXIETY DISORDER: ICD-10-CM

## 2025-09-03 PROCEDURE — 1036F TOBACCO NON-USER: CPT

## 2025-09-03 PROCEDURE — 99213 OFFICE O/P EST LOW 20 MIN: CPT

## 2025-09-03 RX ORDER — LISDEXAMFETAMINE DIMESYLATE 10 MG/1
10 CAPSULE ORAL DAILY
Qty: 30 CAPSULE | Refills: 0 | Status: SHIPPED | OUTPATIENT
Start: 2025-09-03 | End: 2025-10-03

## 2025-09-03 ASSESSMENT — PATIENT HEALTH QUESTIONNAIRE - PHQ9
1. LITTLE INTEREST OR PLEASURE IN DOING THINGS: SEVERAL DAYS
9. THOUGHTS THAT YOU WOULD BE BETTER OFF DEAD, OR OF HURTING YOURSELF: NOT AT ALL
5. POOR APPETITE OR OVEREATING: NEARLY EVERY DAY
SUM OF ALL RESPONSES TO PHQ9 QUESTIONS 1 AND 2: 2
SUM OF ALL RESPONSES TO PHQ QUESTIONS 1-9: 14
6. FEELING BAD ABOUT YOURSELF - OR THAT YOU ARE A FAILURE OR HAVE LET YOURSELF OR YOUR FAMILY DOWN: SEVERAL DAYS
2. FEELING DOWN, DEPRESSED OR HOPELESS: SEVERAL DAYS
4. FEELING TIRED OR HAVING LITTLE ENERGY: MORE THAN HALF THE DAYS
8. MOVING OR SPEAKING SO SLOWLY THAT OTHER PEOPLE COULD HAVE NOTICED. OR THE OPPOSITE, BEING SO FIGETY OR RESTLESS THAT YOU HAVE BEEN MOVING AROUND A LOT MORE THAN USUAL: MORE THAN HALF THE DAYS
7. TROUBLE CONCENTRATING ON THINGS, SUCH AS READING THE NEWSPAPER OR WATCHING TELEVISION: SEVERAL DAYS
3. TROUBLE FALLING OR STAYING ASLEEP OR SLEEPING TOO MUCH: NEARLY EVERY DAY

## 2025-09-03 ASSESSMENT — ANXIETY QUESTIONNAIRES
1. FEELING NERVOUS, ANXIOUS, OR ON EDGE: SEVERAL DAYS
6. BECOMING EASILY ANNOYED OR IRRITABLE: SEVERAL DAYS
3. WORRYING TOO MUCH ABOUT DIFFERENT THINGS: NOT AT ALL
7. FEELING AFRAID AS IF SOMETHING AWFUL MIGHT HAPPEN: NOT AT ALL
2. NOT BEING ABLE TO STOP OR CONTROL WORRYING: NOT AT ALL
5. BEING SO RESTLESS THAT IT IS HARD TO SIT STILL: SEVERAL DAYS
GAD7 TOTAL SCORE: 4
IF YOU CHECKED OFF ANY PROBLEMS ON THIS QUESTIONNAIRE, HOW DIFFICULT HAVE THESE PROBLEMS MADE IT FOR YOU TO DO YOUR WORK, TAKE CARE OF THINGS AT HOME, OR GET ALONG WITH OTHER PEOPLE: NOT DIFFICULT AT ALL
4. TROUBLE RELAXING: SEVERAL DAYS

## 2025-09-03 ASSESSMENT — PAIN SCALES - GENERAL: PAINLEVEL_OUTOF10: 0-NO PAIN

## 2025-09-16 ENCOUNTER — APPOINTMENT (OUTPATIENT)
Dept: PRIMARY CARE | Facility: CLINIC | Age: 35
End: 2025-09-16
Payer: MEDICARE

## 2025-10-03 ENCOUNTER — APPOINTMENT (OUTPATIENT)
Dept: PRIMARY CARE | Facility: CLINIC | Age: 35
End: 2025-10-03
Payer: MEDICARE